# Patient Record
Sex: FEMALE | Race: WHITE | Employment: OTHER | URBAN - METROPOLITAN AREA
[De-identification: names, ages, dates, MRNs, and addresses within clinical notes are randomized per-mention and may not be internally consistent; named-entity substitution may affect disease eponyms.]

---

## 2017-01-25 ENCOUNTER — HOSPITAL ENCOUNTER (OUTPATIENT)
Dept: GENERAL RADIOLOGY | Facility: HOSPITAL | Age: 62
Discharge: HOME | End: 2017-01-25
Attending: PHYSICIAN ASSISTANT
Payer: COMMERCIAL

## 2017-01-25 ENCOUNTER — OFFICE VISIT (OUTPATIENT)
Dept: FAMILY MEDICINE CLINIC | Facility: CLINIC | Age: 62
End: 2017-01-25

## 2017-01-25 VITALS
SYSTOLIC BLOOD PRESSURE: 100 MMHG | DIASTOLIC BLOOD PRESSURE: 60 MMHG | TEMPERATURE: 99 F | HEIGHT: 65 IN | BODY MASS INDEX: 21.83 KG/M2 | RESPIRATION RATE: 16 BRPM | HEART RATE: 60 BPM | WEIGHT: 131 LBS

## 2017-01-25 DIAGNOSIS — M25.561 ACUTE PAIN OF RIGHT KNEE: ICD-10-CM

## 2017-01-25 DIAGNOSIS — M25.561 ACUTE PAIN OF RIGHT KNEE: Primary | ICD-10-CM

## 2017-01-25 PROCEDURE — 99213 OFFICE O/P EST LOW 20 MIN: CPT | Performed by: PHYSICIAN ASSISTANT

## 2017-01-25 PROCEDURE — 73560 X-RAY EXAM OF KNEE 1 OR 2: CPT

## 2017-01-25 RX ORDER — NAPROXEN 500 MG/1
500 TABLET ORAL 2 TIMES DAILY WITH MEALS
Qty: 30 TABLET | Refills: 0 | Status: SHIPPED | OUTPATIENT
Start: 2017-01-25 | End: 2017-06-27

## 2017-01-25 NOTE — PROGRESS NOTES
CC:  Patient presents with:  Knee Pain: pain in right knee persistant for one month      HPI: Linda Orantes presents for complaints of medial right knee pain for about a month. She denies any injury; it started hurting after a long walk.  It was aggravated by a t 100/60 mmHg  Pulse 60  Temp(Src) 98.6 °F (37 °C) (Oral)  Resp 16  Ht 65\"  Wt 131 lb  BMI 21.80 kg/m2    Vital signs reviewed.     Constitutional: Well developed, well nourished; in no acute distress  HEENT:  Head: Normocephalic, atraumatic  Ears: Normal ex defined types were placed in this encounter. Signed Prescriptions Disp Refills    naproxen 500 MG Oral Tab 30 tablet 0      Sig: Take 1 tablet (500 mg total) by mouth 2 (two) times daily with meals.

## 2017-03-03 ENCOUNTER — HOSPITAL ENCOUNTER (OUTPATIENT)
Dept: PHYSICAL THERAPY | Facility: HOSPITAL | Age: 62
Setting detail: THERAPIES SERIES
Discharge: HOME | End: 2017-03-03
Attending: PHYSICIAN ASSISTANT
Payer: COMMERCIAL

## 2017-03-03 DIAGNOSIS — M25.561 ACUTE PAIN OF RIGHT KNEE: Primary | ICD-10-CM

## 2017-03-03 PROCEDURE — 97162 PT EVAL MOD COMPLEX 30 MIN: CPT

## 2017-03-03 PROCEDURE — 97110 THERAPEUTIC EXERCISES: CPT

## 2017-03-03 NOTE — PROGRESS NOTES
PHYSICAL THERAPY EVALUATION:   Referring Physician: Dr. Majano ref.  provider found  Diagnosis: Acute pain of right knee (M25.561)     Date of Service: 8/9/2016     PATIENT SUMMARY     ASSESSMENT  The patient's signs and symptoms are consistent with a PT diagno Right after it happened she was limping around on it for a few weeks and since then it has significantly improved, however she still cannot run because even brisk walking increases the pain.  She has had 2 visits of chiropractic treatment for the knee which symptoms, patient was independent with all ADLs, IADLs, work and recreational activities.     Past Medical History:   Past Medical History   Diagnosis Date   • Cervicalgia    • Iritis    • Varicose vein of leg    • Umbilical hernia      Past Surgical Histor Hip/Knee/Ankle Range of Motion  Hip PROM Left Right   Flexion WNL  WNL   Extension WNL WNL   Internal Rotation WNL WNL   External Rotation WNL WNL   Knee AROM Left Right   Extension & Flexion 2-0-135°  0-4-130° pain at end range flexion, but more so ex discussed with the patient for all treatment options as a component of the elements of informed consent which was obtained verbally prior to the evaluation and again prior to treatment.     Education in normal anatomy and treatment diagnosis, as well as rel visits  Current status G Code: NA  Goal status G Code: NA    Patient/Family/Caregiver was advised of these findings, precautions, and treatment options and has agreed to actively participate in planning and for this course of care. The patient was given my

## 2017-03-08 ENCOUNTER — HOSPITAL ENCOUNTER (OUTPATIENT)
Dept: PHYSICAL THERAPY | Facility: HOSPITAL | Age: 62
Setting detail: THERAPIES SERIES
End: 2017-03-08
Attending: PHYSICIAN ASSISTANT
Payer: COMMERCIAL

## 2017-03-10 ENCOUNTER — HOSPITAL ENCOUNTER (OUTPATIENT)
Dept: PHYSICAL THERAPY | Facility: HOSPITAL | Age: 62
Setting detail: THERAPIES SERIES
Discharge: HOME | End: 2017-03-10
Attending: PHYSICIAN ASSISTANT
Payer: COMMERCIAL

## 2017-03-10 PROCEDURE — 97110 THERAPEUTIC EXERCISES: CPT

## 2017-03-10 PROCEDURE — 97140 MANUAL THERAPY 1/> REGIONS: CPT

## 2017-03-10 NOTE — PROGRESS NOTES
Dx: Acute pain of right knee (M25.561)           Authorized # of Visits:  No c/p, 60 visit limit         Next MD visit: none scheduled  Fall Risk: standard         Precautions: n/a            SUBJECTIVE:  Patient reports the pain is unchanged since last we walking, squatting, or SLR with DF         Supine SLR with DF while raising leg, x10 reps to end range hip flexion where pain increases- at this point DF and PF foot x5 reps, repeat cycle needs continued skilled Physical Therapy for criterion based progression of the rehabilitation program. Skilled Physical Therapy is needed to gather data by observation, hands-on assessment and patient inquiry.  The therapist's skill is needed to make clini

## 2017-03-15 ENCOUNTER — APPOINTMENT (OUTPATIENT)
Dept: PHYSICAL THERAPY | Facility: HOSPITAL | Age: 62
End: 2017-03-15
Attending: PHYSICIAN ASSISTANT
Payer: COMMERCIAL

## 2017-03-17 ENCOUNTER — APPOINTMENT (OUTPATIENT)
Dept: PHYSICAL THERAPY | Facility: HOSPITAL | Age: 62
End: 2017-03-17
Attending: PHYSICIAN ASSISTANT
Payer: COMMERCIAL

## 2017-03-22 ENCOUNTER — APPOINTMENT (OUTPATIENT)
Dept: PHYSICAL THERAPY | Facility: HOSPITAL | Age: 62
End: 2017-03-22
Attending: PHYSICIAN ASSISTANT
Payer: COMMERCIAL

## 2017-03-29 ENCOUNTER — APPOINTMENT (OUTPATIENT)
Dept: PHYSICAL THERAPY | Facility: HOSPITAL | Age: 62
End: 2017-03-29
Attending: PHYSICIAN ASSISTANT
Payer: COMMERCIAL

## 2017-03-31 ENCOUNTER — APPOINTMENT (OUTPATIENT)
Dept: PHYSICAL THERAPY | Facility: HOSPITAL | Age: 62
End: 2017-03-31
Attending: PHYSICIAN ASSISTANT
Payer: COMMERCIAL

## 2017-06-26 NOTE — PROGRESS NOTES
The patient is a pleasant 61-year-old female who presents for her first postoperative evaluation. The patient underwent an umbilical hernia repair with Dr. Lyssa Silver on Марина 15, 2017.

## 2017-06-27 ENCOUNTER — OFFICE VISIT (OUTPATIENT)
Dept: SURGERY | Facility: CLINIC | Age: 62
End: 2017-06-27

## 2017-06-27 VITALS
DIASTOLIC BLOOD PRESSURE: 66 MMHG | WEIGHT: 132 LBS | SYSTOLIC BLOOD PRESSURE: 100 MMHG | HEIGHT: 66 IN | TEMPERATURE: 98 F | BODY MASS INDEX: 21.21 KG/M2 | HEART RATE: 61 BPM

## 2017-06-27 DIAGNOSIS — K42.0 UMBILICAL HERNIA, INCARCERATED: Primary | ICD-10-CM

## 2017-06-27 PROCEDURE — 99024 POSTOP FOLLOW-UP VISIT: CPT | Performed by: PHYSICIAN ASSISTANT

## 2017-06-27 RX ORDER — ACETAMINOPHEN 160 MG
2000 TABLET,DISINTEGRATING ORAL DAILY
COMMUNITY

## 2017-06-27 RX ORDER — MULTIVITAMIN WITH IRON
250 TABLET ORAL
COMMUNITY

## 2017-06-27 NOTE — PROGRESS NOTES
Post Operative Visit Note       Active Problems  1. Umbilical hernia, incarcerated         Chief Complaint   Patient presents with:  Post-Op: 1st post op visit---umbilical hernia repair done 6/15/17 with Dr. Gurinder Fernandez.           History of Present Illness   The Number of children:               Social History Main Topics    Smoking status: Never Smoker                                                                Smokeless tobacco: Never Used                        Alcohol use: Yes           0.0 o to person, place, and time. She appears well-developed and well-nourished. No distress. HENT:   Head: Normocephalic and atraumatic. Mouth/Throat: Oropharynx is clear and moist.   Eyes: Conjunctivae and EOM are normal. No scleral icterus.    Neck: Normal it is not currently bothering her. I advised her to take Benadryl if she experiences any pruritus and to give us a call if it worsens. All questions were answered. The patient is happy with the results of her surgery.   She will follow-up on an as-need

## 2017-08-30 ENCOUNTER — TELEPHONE (OUTPATIENT)
Dept: FAMILY MEDICINE CLINIC | Facility: CLINIC | Age: 62
End: 2017-08-30

## 2017-08-30 DIAGNOSIS — Z01.89 ROUTINE LAB DRAW: Primary | ICD-10-CM

## 2017-09-06 ENCOUNTER — TELEPHONE (OUTPATIENT)
Dept: FAMILY MEDICINE CLINIC | Facility: CLINIC | Age: 62
End: 2017-09-06

## 2017-09-06 DIAGNOSIS — Z00.00 ROUTINE GENERAL MEDICAL EXAMINATION AT A HEALTH CARE FACILITY: Primary | ICD-10-CM

## 2017-09-06 NOTE — TELEPHONE ENCOUNTER
Added hepatitis b surface antibody for screening immunity at [facility]. Previous wellness labs still on file there.

## 2017-09-06 NOTE — TELEPHONE ENCOUNTER
Patient has blood work due and is wondering if she can have her Hep B antibody tested at that time as well, please add to her blood work at [address]

## 2017-09-07 ENCOUNTER — HOSPITAL ENCOUNTER (OUTPATIENT)
Dept: ULTRASOUND IMAGING | Facility: HOSPITAL | Age: 62
Discharge: HOME | End: 2017-09-07
Attending: SURGERY
Payer: COMMERCIAL

## 2017-09-07 DIAGNOSIS — I83.813 VARICOSE VEINS OF BILATERAL LOWER EXTREMITIES WITH PAIN: ICD-10-CM

## 2017-09-07 PROCEDURE — 93970 EXTREMITY STUDY: CPT | Performed by: SURGERY

## 2017-09-14 ENCOUNTER — OFFICE VISIT (OUTPATIENT)
Dept: SURGERY | Facility: CLINIC | Age: 62
End: 2017-09-14

## 2017-09-14 VITALS
BODY MASS INDEX: 21.21 KG/M2 | HEIGHT: 66 IN | SYSTOLIC BLOOD PRESSURE: 98 MMHG | DIASTOLIC BLOOD PRESSURE: 63 MMHG | WEIGHT: 132 LBS | HEART RATE: 74 BPM | TEMPERATURE: 98 F

## 2017-09-14 DIAGNOSIS — I83.813 VARICOSE VEINS OF BILATERAL LOWER EXTREMITIES WITH PAIN: Primary | ICD-10-CM

## 2017-09-14 PROCEDURE — 99213 OFFICE O/P EST LOW 20 MIN: CPT | Performed by: SURGERY

## 2017-09-14 NOTE — PROGRESS NOTES
Follow Up Visit Note       Active Problems      No diagnosis found. Chief Complaint   Patient presents with:  Varicose Veins: Follow up after venous ultrasound done 9/7/17 at [address]          History of Present Illness        Allergies  Roberth Peña has No me during today. Review of Systems   Constitutional: Negative for chills, diaphoresis, fatigue, fever and unexpected weight change. HENT: Negative for hearing loss, nosebleeds, sore throat and trouble swallowing.     Respiratory: Negative for apnea, coug

## 2017-09-15 NOTE — PROGRESS NOTES
GENERAL SURGERY    Abbey Peabody, MD, FACS, Cheryl Graves. Keily Carver MD, Makenna Bond MD, FACS  Fátima Davalos.  Cydney Mcguire MD, Russel Mandujano MD, CATHY Barboza PA-C

## 2017-09-19 ENCOUNTER — TELEPHONE (OUTPATIENT)
Dept: SURGERY | Facility: CLINIC | Age: 62
End: 2017-09-19

## 2017-09-21 LAB
ABSOLUTE BASOPHILS: 19 CELLS/UL (ref 0–200)
ABSOLUTE EOSINOPHILS: 109 CELLS/UL (ref 15–500)
ABSOLUTE LYMPHOCYTES: 1571 CELLS/UL (ref 850–3900)
ABSOLUTE MONOCYTES: 317 CELLS/UL (ref 200–950)
ABSOLUTE NEUTROPHILS: 1184 CELLS/UL (ref 1500–7800)
ALBUMIN/GLOBULIN RATIO: 1.7 (CALC) (ref 1–2.5)
ALBUMIN: 4.4 G/DL (ref 3.6–5.1)
ALKALINE PHOSPHATASE: 75 U/L (ref 33–130)
ALT: 13 U/L (ref 6–29)
AST: 15 U/L (ref 10–35)
BASOPHILS: 0.6 %
BILIRUBIN, TOTAL: 0.6 MG/DL (ref 0.2–1.2)
BUN: 15 MG/DL (ref 7–25)
CALCIUM: 10.2 MG/DL (ref 8.6–10.4)
CARBON DIOXIDE: 29 MMOL/L (ref 20–31)
CHLORIDE: 103 MMOL/L (ref 98–110)
CHOL/HDLC RATIO: 2.7 (CALC)
CHOLESTEROL, TOTAL: 248 MG/DL
CREATININE: 0.65 MG/DL (ref 0.5–0.99)
EGFR IF AFRICN AM: 112 ML/MIN/1.73M2
EGFR IF NONAFRICN AM: 96 ML/MIN/1.73M2
EOSINOPHILS: 3.4 %
GLOBULIN: 2.6 G/DL (CALC) (ref 1.9–3.7)
GLUCOSE: 84 MG/DL (ref 65–99)
HDL CHOLESTEROL: 91 MG/DL
HEMATOCRIT: 41.8 % (ref 35–45)
HEMOGLOBIN: 14.3 G/DL (ref 11.7–15.5)
HEPATITIS B SURFACE$ANTIBODY QL: REACTIVE
LDL-CHOLESTEROL: 140 MG/DL (CALC)
LYMPHOCYTES: 49.1 %
MCH: 30.7 PG (ref 27–33)
MCHC: 34.2 G/DL (ref 32–36)
MCV: 89.7 FL (ref 80–100)
MONOCYTES: 9.9 %
MPV: 9.2 FL (ref 7.5–12.5)
NEUTROPHILS: 37 %
NON-HDL CHOLESTEROL: 157 MG/DL (CALC)
PLATELET COUNT: 203 THOUSAND/UL (ref 140–400)
POTASSIUM: 4.2 MMOL/L (ref 3.5–5.3)
PROTEIN, TOTAL: 7 G/DL (ref 6.1–8.1)
RDW: 12.5 % (ref 11–15)
RED BLOOD CELL COUNT: 4.66 MILLION/UL (ref 3.8–5.1)
SODIUM: 140 MMOL/L (ref 135–146)
TRIGLYCERIDES: 77 MG/DL
TSH W/REFLEX TO FT4: 2.26 MIU/L (ref 0.4–4.5)
VITAMIN D, 25-OH, TOTAL: 27 NG/ML (ref 30–100)
WHITE BLOOD CELL COUNT: 3.2 THOUSAND/UL (ref 3.8–10.8)

## 2017-09-29 ENCOUNTER — OFFICE VISIT (OUTPATIENT)
Dept: FAMILY MEDICINE CLINIC | Facility: CLINIC | Age: 62
End: 2017-09-29

## 2017-09-29 VITALS
WEIGHT: 129 LBS | SYSTOLIC BLOOD PRESSURE: 90 MMHG | DIASTOLIC BLOOD PRESSURE: 60 MMHG | HEART RATE: 60 BPM | BODY MASS INDEX: 21.23 KG/M2 | RESPIRATION RATE: 16 BRPM | HEIGHT: 65.5 IN | TEMPERATURE: 97 F

## 2017-09-29 DIAGNOSIS — R53.83 FATIGUE, UNSPECIFIED TYPE: ICD-10-CM

## 2017-09-29 DIAGNOSIS — Z12.31 ENCOUNTER FOR SCREENING MAMMOGRAM FOR BREAST CANCER: ICD-10-CM

## 2017-09-29 DIAGNOSIS — Z11.51 SCREENING FOR HUMAN PAPILLOMAVIRUS (HPV): Primary | ICD-10-CM

## 2017-09-29 DIAGNOSIS — D72.819 LEUKOPENIA, UNSPECIFIED TYPE: ICD-10-CM

## 2017-09-29 DIAGNOSIS — I87.2 CHRONIC VENOUS STASIS DERMATITIS OF BOTH LOWER EXTREMITIES: ICD-10-CM

## 2017-09-29 DIAGNOSIS — L60.8 CHANGE IN NAIL APPEARANCE: ICD-10-CM

## 2017-09-29 DIAGNOSIS — E55.9 VITAMIN D DEFICIENCY: ICD-10-CM

## 2017-09-29 DIAGNOSIS — I83.813 VARICOSE VEINS OF BILATERAL LOWER EXTREMITIES WITH PAIN: ICD-10-CM

## 2017-09-29 DIAGNOSIS — M85.80 OSTEOPENIA, UNSPECIFIED LOCATION: ICD-10-CM

## 2017-09-29 DIAGNOSIS — Z12.4 SCREENING FOR CERVICAL CANCER: ICD-10-CM

## 2017-09-29 DIAGNOSIS — Z00.00 ROUTINE GENERAL MEDICAL EXAMINATION AT A HEALTH CARE FACILITY: ICD-10-CM

## 2017-09-29 PROCEDURE — 90471 IMMUNIZATION ADMIN: CPT | Performed by: NURSE PRACTITIONER

## 2017-09-29 PROCEDURE — 88175 CYTOPATH C/V AUTO FLUID REDO: CPT | Performed by: NURSE PRACTITIONER

## 2017-09-29 PROCEDURE — 87624 HPV HI-RISK TYP POOLED RSLT: CPT | Performed by: NURSE PRACTITIONER

## 2017-09-29 PROCEDURE — 99396 PREV VISIT EST AGE 40-64: CPT | Performed by: NURSE PRACTITIONER

## 2017-09-29 PROCEDURE — 90686 IIV4 VACC NO PRSV 0.5 ML IM: CPT | Performed by: NURSE PRACTITIONER

## 2017-09-29 NOTE — PROGRESS NOTES
HPI:   Giles Gardner is a 62year old female who presents for a complete physical exam. Last PE 1 year ago. Hx of declining neutrophils on labs with reported fatigue. No fever, chills, night sweats or appetite changes.  C/o belly fat and feeling less Resp 16   Ht 65.5\"   Wt 129 lb   BMI 21.14 kg/m²   Body mass index is 21.14 kg/m². GENERAL: well developed, well nourished, in no apparent distress. SKIN: Warm, dry, pink without rashes, no suspicious lesions.   HEENT: atraumatic, normocephalic,ears, n diagnosis)      Orders Placed This Encounter      Vitamin B12 [E]      CBC W Differential W Platelet [E]      Hpv High Risk , Thin Prep Collection      Flulaval 0.5 ml >= 6 months Quad single dose Pres Free (95180)      ThinPrep PAP Smear    Meds & Refills

## 2017-09-29 NOTE — PATIENT INSTRUCTIONS
Reviewed recent labs and results, low WBC and neurophil's. Low Vitamin D. Recheck non fasting CBC and Vitamin B12 in 1 month. Immunizations reviewed, recommend: flu vaccine this fall season.   Supplements recommended: Vitamin D 2000 IU daily, Calcium 500

## 2017-10-11 ENCOUNTER — TELEPHONE (OUTPATIENT)
Dept: SURGERY | Facility: CLINIC | Age: 62
End: 2017-10-11

## 2017-10-11 NOTE — TELEPHONE ENCOUNTER
Sent predetermination for ablation of the greater saphenous vein to [facility] [phone] fax #

## 2017-10-18 ENCOUNTER — TELEPHONE (OUTPATIENT)
Dept: SURGERY | Facility: CLINIC | Age: 62
End: 2017-10-18

## 2017-11-01 ENCOUNTER — OFFICE VISIT (OUTPATIENT)
Dept: FAMILY MEDICINE CLINIC | Facility: CLINIC | Age: 62
End: 2017-11-01

## 2017-11-01 ENCOUNTER — TELEPHONE (OUTPATIENT)
Dept: SURGERY | Facility: CLINIC | Age: 62
End: 2017-11-01

## 2017-11-01 VITALS
HEART RATE: 64 BPM | BODY MASS INDEX: 20.95 KG/M2 | SYSTOLIC BLOOD PRESSURE: 90 MMHG | WEIGHT: 128.81 LBS | TEMPERATURE: 99 F | DIASTOLIC BLOOD PRESSURE: 60 MMHG | HEIGHT: 65.6 IN | RESPIRATION RATE: 14 BRPM

## 2017-11-01 DIAGNOSIS — J01.00 ACUTE NON-RECURRENT MAXILLARY SINUSITIS: Primary | ICD-10-CM

## 2017-11-01 PROCEDURE — 99213 OFFICE O/P EST LOW 20 MIN: CPT | Performed by: FAMILY MEDICINE

## 2017-11-01 RX ORDER — AMOXICILLIN AND CLAVULANATE POTASSIUM 875; 125 MG/1; MG/1
1 TABLET, FILM COATED ORAL 2 TIMES DAILY
Qty: 14 TABLET | Refills: 0 | Status: SHIPPED | OUTPATIENT
Start: 2017-11-01 | End: 2017-11-08

## 2017-11-01 RX ORDER — FLUTICASONE PROPIONATE 50 MCG
2 SPRAY, SUSPENSION (ML) NASAL DAILY
Qty: 1 BOTTLE | Refills: 3 | Status: SHIPPED | OUTPATIENT
Start: 2017-11-01 | End: 2018-03-01

## 2017-11-01 RX ORDER — ALBUTEROL SULFATE 90 UG/1
2 AEROSOL, METERED RESPIRATORY (INHALATION) EVERY 6 HOURS PRN
COMMUNITY

## 2017-11-01 NOTE — TELEPHONE ENCOUNTER
[place] [employer] [place] sent for vein ablation surgery. Mitchell Aldana at [facility] said it has been approved Auth# [id number]. He will mail and fax authorization letter.

## 2017-11-01 NOTE — PROGRESS NOTES
Patient presents with:  Cough: dry cough    Chest Congestion  Fatigue  Order Followup: would like testing to determine if ASTHMA is in hisotry     HPI:   John Knox is a 62year old female who presents to the office for eval of cough, congestion. 3      Albuterol - use PRN. No asthma, but seems like she may have a hyperreactive airway.       Marcus Ramirez M.D.   EMG 3  11/01/17

## 2017-11-13 ENCOUNTER — PATIENT MESSAGE (OUTPATIENT)
Dept: INTERNAL MEDICINE CLINIC | Facility: CLINIC | Age: 62
End: 2017-11-13

## 2017-11-13 NOTE — TELEPHONE ENCOUNTER
From: Yulisa Hamilton  To: BRIGIDO Beck  Sent: 11/13/2017 8:42 AM CST  Subject: Test Results Question    Hello,   Thank you for your quick response with my blood tests.  I was on an antibiotic for 1 week (sinus infection) and waited just 2 days

## 2017-11-22 ENCOUNTER — HOSPITAL ENCOUNTER (OUTPATIENT)
Dept: MAMMOGRAPHY | Age: 62
Discharge: HOME | End: 2017-11-22
Attending: NURSE PRACTITIONER
Payer: COMMERCIAL

## 2017-11-22 DIAGNOSIS — Z12.31 ENCOUNTER FOR SCREENING MAMMOGRAM FOR BREAST CANCER: ICD-10-CM

## 2017-11-22 PROCEDURE — 77067 SCR MAMMO BI INCL CAD: CPT | Performed by: NURSE PRACTITIONER

## 2017-11-29 ENCOUNTER — OFFICE VISIT (OUTPATIENT)
Dept: SURGERY | Facility: CLINIC | Age: 62
End: 2017-11-29

## 2017-11-29 VITALS — HEART RATE: 67 BPM | TEMPERATURE: 98 F | SYSTOLIC BLOOD PRESSURE: 113 MMHG | DIASTOLIC BLOOD PRESSURE: 76 MMHG

## 2017-11-29 DIAGNOSIS — I87.2 CHRONIC VENOUS STASIS DERMATITIS OF BOTH LOWER EXTREMITIES: ICD-10-CM

## 2017-11-29 DIAGNOSIS — I83.813 VARICOSE VEINS OF BILATERAL LOWER EXTREMITIES WITH PAIN: Primary | ICD-10-CM

## 2017-11-29 PROCEDURE — 36475 ENDOVENOUS RF 1ST VEIN: CPT | Performed by: SURGERY

## 2017-11-29 NOTE — PROGRESS NOTES
GENERAL SURGERY    Inessa Rendon MD, FACS, Nel Joya. Clarita Ramirez MD, Richard Gonzales MD, FACS  Yohan Ruano.  Harleen Mckee MD, Sumit Hood MD, CATHY Barboza PA-C

## 2017-11-29 NOTE — PROCEDURES
.bkoff\  Operative Note:    Date of Procedure:    November 29, 2017    Preoperative diagnosis: Venous insufficiency, varicose veins, abnormal greater saphenous vein.     Postoperative diagnosis: Same    Procedure: Endovenous radiofrequency ablation of the l procedure. Beginning in the most distal access point of the greater saphenous vein, an area was anesthetized with 1% lidocaine. Using the Seldinger technique, a guidewire was placed into the greater saphenous vein with ultrasound guidance.  After the wir small skin incision was closed with a Steri-Strip. A compression wrap was applied to the lower extremity from the level of the foot to the proximal thigh. This patient had 7 cycles. Total time of treatment was 2 minutes, and 20 seconds.     The shivam

## 2017-12-06 ENCOUNTER — NURSE ONLY (OUTPATIENT)
Dept: SURGERY | Facility: CLINIC | Age: 62
End: 2017-12-06

## 2017-12-10 ENCOUNTER — OFFICE VISIT (OUTPATIENT)
Dept: FAMILY MEDICINE CLINIC | Facility: CLINIC | Age: 62
End: 2017-12-10

## 2017-12-10 VITALS
OXYGEN SATURATION: 98 % | HEART RATE: 60 BPM | TEMPERATURE: 98 F | WEIGHT: 129 LBS | SYSTOLIC BLOOD PRESSURE: 108 MMHG | DIASTOLIC BLOOD PRESSURE: 78 MMHG | BODY MASS INDEX: 21 KG/M2

## 2017-12-10 DIAGNOSIS — R39.9 UTI SYMPTOMS: Primary | ICD-10-CM

## 2017-12-10 PROCEDURE — 87086 URINE CULTURE/COLONY COUNT: CPT | Performed by: NURSE PRACTITIONER

## 2017-12-10 PROCEDURE — 99213 OFFICE O/P EST LOW 20 MIN: CPT | Performed by: NURSE PRACTITIONER

## 2017-12-10 PROCEDURE — 81003 URINALYSIS AUTO W/O SCOPE: CPT | Performed by: NURSE PRACTITIONER

## 2017-12-10 RX ORDER — NITROFURANTOIN 25; 75 MG/1; MG/1
100 CAPSULE ORAL 2 TIMES DAILY
Qty: 14 CAPSULE | Refills: 0 | Status: SHIPPED | OUTPATIENT
Start: 2017-12-10 | End: 2018-01-01 | Stop reason: ALTCHOICE

## 2017-12-10 NOTE — PATIENT INSTRUCTIONS
Take antibiotics with food and plenty of water. Eat yogurt or take probiotic daily. Align is a good otc probiotic. Bladder Infection, Female (Adult)    Urine is normally doesn't have any bacteria in it.  But bacteria can get into the urinary tract f and infection. This usually happens because of:  · Wiping improperly after urinating. Always wipe from front to back. · Bowel incontinence  · Pregnancy  · Procedures such as having a catheter inserted  · Older age  · Not emptying your bladder.  This can al and less junk and fatty foods. · Avoid sex until your symptoms are gone. · Avoid caffeine, alcohol, and spicy foods. These can irritate your bladder. · Urinate right after intercourse to flush out your bladder.   · If you use birth control pills and have

## 2017-12-10 NOTE — PROGRESS NOTES
Manish Ballard is a 65year old female. HPI:   Patient presents with symptoms of UTI I for 1 days. Complaining of urinary frequency, urgency and dysuria.  First noted burning with urination this am. Denies suprapubic pain, back pain, fever and hemat GENERAL: well developed, well nourished,in no apparent distress, pleasant pt. SKIN: no rashes,no suspicious lesions  LUNG: Clear to auscultation bilaterally. No W/R/R.  HEART: RRR, no murmur.   GI: normoactive BS x4, no masses, HSM; +suprapubic tenderness, The phrases \"bladder infection,\" \"UTI,\" and \"cystitis\" are often used to describe the same thing. But they are not always the same. Cystitis is an inflammation of the bladder. The most common cause of cystitis is an infection.   Symptoms  The infectio · Take antibiotics until they are used up, even if you feel better. It is important to finish them to make sure the infection has cleared. · You can use acetaminophen or ibuprofen for pain, fever, or discomfort, unless another medicine was prescribed.  If If X-rays were done, you will be told if the results will affect your treatment.   Call 911  Call 911 if any of the following occur:  · Trouble breathing  · Hard to wake up or confusion  · Fainting or loss of consciousness  · Rapid heart rate  When to seek

## 2017-12-14 ENCOUNTER — OFFICE VISIT (OUTPATIENT)
Dept: SURGERY | Facility: CLINIC | Age: 62
End: 2017-12-14

## 2017-12-14 VITALS
BODY MASS INDEX: 20.73 KG/M2 | TEMPERATURE: 97 F | WEIGHT: 129 LBS | SYSTOLIC BLOOD PRESSURE: 102 MMHG | DIASTOLIC BLOOD PRESSURE: 60 MMHG | HEIGHT: 66 IN | HEART RATE: 64 BPM

## 2017-12-14 DIAGNOSIS — I87.2 CHRONIC VENOUS STASIS DERMATITIS OF BOTH LOWER EXTREMITIES: ICD-10-CM

## 2017-12-14 DIAGNOSIS — Z98.890 S/P RADIOFREQUENCY ABLATION OPERATION FOR ARRHYTHMIA: Primary | ICD-10-CM

## 2017-12-14 DIAGNOSIS — Z86.79 S/P RADIOFREQUENCY ABLATION OPERATION FOR ARRHYTHMIA: Primary | ICD-10-CM

## 2017-12-14 DIAGNOSIS — I83.813 VARICOSE VEINS OF BILATERAL LOWER EXTREMITIES WITH PAIN: ICD-10-CM

## 2017-12-14 PROCEDURE — 99213 OFFICE O/P EST LOW 20 MIN: CPT | Performed by: SURGERY

## 2017-12-18 NOTE — PROGRESS NOTES
GENERAL SURGERY    Chantel Francois MD, FACS, Gunner Crespo. Yunior Hanna MD, Pawan Odom MD, FACS  Glenroye Ambrocio.  Genoveva Delgado MD, Nancy Retana MD, CATHY Barboza PA-C

## 2018-01-01 ENCOUNTER — OFFICE VISIT (OUTPATIENT)
Dept: FAMILY MEDICINE CLINIC | Facility: CLINIC | Age: 63
End: 2018-01-01

## 2018-01-01 VITALS
SYSTOLIC BLOOD PRESSURE: 114 MMHG | HEART RATE: 92 BPM | WEIGHT: 129 LBS | BODY MASS INDEX: 20.73 KG/M2 | DIASTOLIC BLOOD PRESSURE: 64 MMHG | OXYGEN SATURATION: 99 % | HEIGHT: 66 IN | RESPIRATION RATE: 16 BRPM | TEMPERATURE: 97 F

## 2018-01-01 DIAGNOSIS — R30.0 DYSURIA: ICD-10-CM

## 2018-01-01 DIAGNOSIS — N30.01 ACUTE CYSTITIS WITH HEMATURIA: Primary | ICD-10-CM

## 2018-01-01 LAB
MULTISTIX LOT#: ABNORMAL NUMERIC
PH, URINE: 5.5 (ref 4.5–8)
SPECIFIC GRAVITY: 1.02 (ref 1–1.03)
URINE-COLOR: YELLOW
UROBILINOGEN,SEMI-QN: 0.2 MG/DL (ref 0–1.9)

## 2018-01-01 PROCEDURE — 87086 URINE CULTURE/COLONY COUNT: CPT | Performed by: NURSE PRACTITIONER

## 2018-01-01 PROCEDURE — 99213 OFFICE O/P EST LOW 20 MIN: CPT | Performed by: NURSE PRACTITIONER

## 2018-01-01 PROCEDURE — 81003 URINALYSIS AUTO W/O SCOPE: CPT | Performed by: NURSE PRACTITIONER

## 2018-01-01 RX ORDER — SULFAMETHOXAZOLE AND TRIMETHOPRIM 800; 160 MG/1; MG/1
1 TABLET ORAL 2 TIMES DAILY
Qty: 6 TABLET | Refills: 0 | Status: SHIPPED | OUTPATIENT
Start: 2018-01-01 | End: 2018-01-04

## 2018-01-01 NOTE — PROGRESS NOTES
Alonso Pineda is a 65year old female. HPI:   Patient presents with symptoms of UTI for 1 days. Complaining of urinary frequency, urgency, dysuria. Denies back pain, fever, hematuria.   Pt has history of UTI in past.  Pt was recently seen on 12/1 LUNG: Clear to auscultation bilaterally. No W/R/R.  HEART: RRR, no murmur.   GI: normoactive BS x4, no masses, HSM; no suprapubic tenderness, no CVAT    RESULTS:      Recent Results (from the past 24 hour(s))  -URINALYSIS, AUTO, W/O SCOPE   Collection Time: The phrases \"bladder infection,\" \"UTI,\" and \"cystitis\" are often used to describe the same thing. But they are not always the same. Cystitis is an inflammation of the bladder. The most common cause of cystitis is an infection.   Symptoms  The infectio · Take antibiotics until they are used up, even if you feel better. It is important to finish them to make sure the infection has cleared. · You can use acetaminophen or ibuprofen for pain, fever, or discomfort, unless another medicine was prescribed.  If If X-rays were done, you will be told if the results will affect your treatment.   Call 911  Call 911 if any of the following occur:  · Trouble breathing  · Hard to wake up or confusion  · Fainting or loss of consciousness  · Rapid heart rate  When to seek

## 2018-01-03 ENCOUNTER — PATIENT MESSAGE (OUTPATIENT)
Dept: FAMILY MEDICINE CLINIC | Facility: CLINIC | Age: 63
End: 2018-01-03

## 2018-01-03 NOTE — TELEPHONE ENCOUNTER
From: Symone Soria  To: Kelly Piña MD  Sent: 1/3/2018 12:06 PM CST  Subject: Test Results Question    Hello,  I am confused about my recent test results. In the past 3 weeks, I have been seen at a [facility] walk-in clinic with UTI symptoms.  The quic

## 2018-01-15 ENCOUNTER — TELEPHONE (OUTPATIENT)
Dept: SURGERY | Facility: CLINIC | Age: 63
End: 2018-01-15

## 2018-01-15 NOTE — TELEPHONE ENCOUNTER
[place] [employer] [place] send clinical information to fax [phone] for approval for bilateral vein injections per Kyra MCCALL

## 2018-01-22 ENCOUNTER — TELEPHONE (OUTPATIENT)
Dept: SURGERY | Facility: CLINIC | Age: 63
End: 2018-01-22

## 2018-01-22 NOTE — TELEPHONE ENCOUNTER
Called patient and informed her received authorization for her vein injections from [facility].   Auth date 1/22/2018 thru 7- UIN#[id number]

## 2018-02-01 ENCOUNTER — OFFICE VISIT (OUTPATIENT)
Dept: SURGERY | Facility: CLINIC | Age: 63
End: 2018-02-01

## 2018-02-01 VITALS
WEIGHT: 129 LBS | BODY MASS INDEX: 21 KG/M2 | DIASTOLIC BLOOD PRESSURE: 64 MMHG | HEART RATE: 85 BPM | TEMPERATURE: 97 F | SYSTOLIC BLOOD PRESSURE: 100 MMHG

## 2018-02-01 DIAGNOSIS — I83.813 VARICOSE VEINS OF BILATERAL LOWER EXTREMITIES WITH PAIN: Primary | ICD-10-CM

## 2018-02-01 DIAGNOSIS — Z98.890 STATUS POST ENDOVENOUS RADIOFREQUENCY ABLATION (RFA) OF SAPHENOUS VEIN: ICD-10-CM

## 2018-02-01 PROCEDURE — 36471 NJX SCLRSNT MLT INCMPTNT VN: CPT | Performed by: SURGERY

## 2018-02-01 NOTE — PROGRESS NOTES
GENERAL SURGERY    Keaton Blanco MD, FACS, Albert Lopez. Tessie Modi MD, Georgie Peterson MD, FACS  Candice Pruitt.  Isidra Cuadra MD, Shant Calhoun MD, [place], PA-C Cheree Frankel PA-C [place]

## 2018-02-01 NOTE — PROCEDURES
Date of Procedure:        2-1-2018    Pre op diagnosis: Varicose Veins-reticular veins-spider angiomata    Post op diagnosis: Same    Procedure: Injection sclerotherapy of Varicose Veins- left    Surgeon:  Campos Fitzpatrick    History of present illness:    This patien office after the procedure for an appropriate period of time. Discharge and after-care directions were given. The patient states understanding and has no questions at this time. A follow up appointment in two weeks will be made.      EBL: none

## 2018-03-01 ENCOUNTER — OFFICE VISIT (OUTPATIENT)
Dept: SURGERY | Facility: CLINIC | Age: 63
End: 2018-03-01

## 2018-03-01 VITALS
SYSTOLIC BLOOD PRESSURE: 104 MMHG | BODY MASS INDEX: 20.89 KG/M2 | HEART RATE: 65 BPM | WEIGHT: 130 LBS | HEIGHT: 66 IN | DIASTOLIC BLOOD PRESSURE: 71 MMHG

## 2018-03-01 DIAGNOSIS — Z98.890 STATUS POST ENDOVENOUS RADIOFREQUENCY ABLATION (RFA) OF SAPHENOUS VEIN: ICD-10-CM

## 2018-03-01 DIAGNOSIS — I83.813 VARICOSE VEINS OF BILATERAL LOWER EXTREMITIES WITH PAIN: Primary | ICD-10-CM

## 2018-03-01 PROCEDURE — 36471 NJX SCLRSNT MLT INCMPTNT VN: CPT | Performed by: SURGERY

## 2018-03-02 NOTE — PROGRESS NOTES
GENERAL SURGERY    Figueroa Gupta MD, FACS, Jeffy Schaeffer. Dejah Rodríguez MD, Chance Navas MD, FACS  Taisha Victoria.  Clarita Bell MD, Tomas Valerio MD, CATHY Barboza PA-C

## 2018-03-02 NOTE — PROCEDURES
Date of Procedure:        March 1, 2018    Pre op diagnosis: Varicose Veins-reticular veins-spider angiomata    Post op diagnosis: Same    Procedure: Injection sclerotherapy of Varicose Veins-bilateral lower extremities    Surgeon:  Fran Funk    History of pre completed. The patient remained in stable condition after the procedure and was observed in our office after the procedure for an appropriate period of time. Discharge and after-care directions were given.   The patient states understanding and has no q

## 2018-04-05 ENCOUNTER — OFFICE VISIT (OUTPATIENT)
Dept: SURGERY | Facility: CLINIC | Age: 63
End: 2018-04-05

## 2018-04-05 VITALS
RESPIRATION RATE: 16 BRPM | HEIGHT: 66 IN | DIASTOLIC BLOOD PRESSURE: 66 MMHG | SYSTOLIC BLOOD PRESSURE: 100 MMHG | BODY MASS INDEX: 20.89 KG/M2 | HEART RATE: 60 BPM | WEIGHT: 130 LBS

## 2018-04-05 DIAGNOSIS — I87.2 CHRONIC VENOUS STASIS DERMATITIS OF BOTH LOWER EXTREMITIES: ICD-10-CM

## 2018-04-05 DIAGNOSIS — I83.813 VARICOSE VEINS OF BILATERAL LOWER EXTREMITIES WITH PAIN: Primary | ICD-10-CM

## 2018-04-05 DIAGNOSIS — Z98.890 STATUS POST ENDOVENOUS RADIOFREQUENCY ABLATION (RFA) OF SAPHENOUS VEIN: ICD-10-CM

## 2018-04-05 PROCEDURE — 36471 NJX SCLRSNT MLT INCMPTNT VN: CPT | Performed by: SURGERY

## 2018-04-05 NOTE — PROGRESS NOTES
Follow Up Visit Note       Active Problems      1. Varicose veins of bilateral lower extremities with pain    2. Status post endovenous radiofrequency ablation (RFA) of saphenous vein    3.  Chronic venous stasis dermatitis of both lower extremities Current Outpatient Prescriptions:  Albuterol Sulfate  (90 Base) MCG/ACT Inhalation Aero Soln Inhale 2 puffs into the lungs every 6 (six) hours as needed for Wheezing. Disp:  Rfl:    Vitamin D3 2000 units Oral Cap Take 2,000 Units by mouth daily. rash noted.    Residual telangiectasia and varicose veins as previously noted            Assessment   Varicose veins of bilateral lower extremities with pain  (primary encounter diagnosis)  Status post endovenous radiofrequency ablation (RFA) of saphenous v

## 2018-04-15 NOTE — PROCEDURES
Date of Procedure:        April 5, 2018    Pre op diagnosis: Varicose Veins-reticular veins-spider angiomata    Post op diagnosis: Same    Procedure: Injection sclerotherapy of Varicose Veins-bilateral lower extremity    Surgeon:  Sammy Pereira    History of prese condition after the procedure and was observed in our office after the procedure for an appropriate period of time. Discharge and after-care directions were given. The patient states understanding and has no questions at this time.   A follow up appointme

## 2018-07-02 ENCOUNTER — TELEPHONE (OUTPATIENT)
Dept: FAMILY MEDICINE CLINIC | Facility: CLINIC | Age: 63
End: 2018-07-02

## 2018-07-02 DIAGNOSIS — Z13.228 SCREENING FOR ENDOCRINE, METABOLIC AND IMMUNITY DISORDER: ICD-10-CM

## 2018-07-02 DIAGNOSIS — Z13.29 SCREENING FOR ENDOCRINE, METABOLIC AND IMMUNITY DISORDER: ICD-10-CM

## 2018-07-02 DIAGNOSIS — E55.9 VITAMIN D DEFICIENCY: ICD-10-CM

## 2018-07-02 DIAGNOSIS — Z13.0 SCREENING FOR ENDOCRINE, METABOLIC AND IMMUNITY DISORDER: ICD-10-CM

## 2018-07-02 DIAGNOSIS — D64.9 ANEMIA, UNSPECIFIED TYPE: Primary | ICD-10-CM

## 2018-07-02 DIAGNOSIS — Z13.220 SCREENING, LIPID: ICD-10-CM

## 2018-09-18 ENCOUNTER — TELEPHONE (OUTPATIENT)
Dept: FAMILY MEDICINE CLINIC | Facility: CLINIC | Age: 63
End: 2018-09-18

## 2018-09-18 DIAGNOSIS — Z13.228 SCREENING FOR ENDOCRINE, METABOLIC AND IMMUNITY DISORDER: ICD-10-CM

## 2018-09-18 DIAGNOSIS — E55.9 VITAMIN D DEFICIENCY: Primary | ICD-10-CM

## 2018-09-18 DIAGNOSIS — D64.9 ANEMIA, UNSPECIFIED TYPE: ICD-10-CM

## 2018-09-18 DIAGNOSIS — Z13.29 SCREENING FOR ENDOCRINE, METABOLIC AND IMMUNITY DISORDER: ICD-10-CM

## 2018-09-18 DIAGNOSIS — Z13.220 SCREENING, LIPID: ICD-10-CM

## 2018-09-18 DIAGNOSIS — Z13.0 SCREENING FOR ENDOCRINE, METABOLIC AND IMMUNITY DISORDER: ICD-10-CM

## 2018-09-20 LAB
ABSOLUTE BASOPHILS: 18 CELLS/UL (ref 0–200)
ABSOLUTE EOSINOPHILS: 130 CELLS/UL (ref 15–500)
ABSOLUTE LYMPHOCYTES: 1433 CELLS/UL (ref 850–3900)
ABSOLUTE MONOCYTES: 346 CELLS/UL (ref 200–950)
ABSOLUTE NEUTROPHILS: 1674 CELLS/UL (ref 1500–7800)
ALBUMIN/GLOBULIN RATIO: 1.6 (CALC) (ref 1–2.5)
ALBUMIN: 4.1 G/DL (ref 3.6–5.1)
ALKALINE PHOSPHATASE: 73 U/L (ref 33–130)
ALT: 11 U/L (ref 6–29)
AST: 16 U/L (ref 10–35)
BASOPHILS: 0.5 %
BILIRUBIN, TOTAL: 0.5 MG/DL (ref 0.2–1.2)
BUN: 22 MG/DL (ref 7–25)
CALCIUM: 9.7 MG/DL (ref 8.6–10.4)
CARBON DIOXIDE: 31 MMOL/L (ref 20–32)
CHLORIDE: 105 MMOL/L (ref 98–110)
CHOL/HDLC RATIO: 2.5 (CALC)
CHOLESTEROL, TOTAL: 219 MG/DL
CREATININE: 0.61 MG/DL (ref 0.5–0.99)
EGFR IF AFRICN AM: 113 ML/MIN/1.73M2
EGFR IF NONAFRICN AM: 98 ML/MIN/1.73M2
EOSINOPHILS: 3.6 %
GLOBULIN: 2.5 G/DL (CALC) (ref 1.9–3.7)
GLUCOSE: 81 MG/DL (ref 65–99)
HDL CHOLESTEROL: 88 MG/DL
HEMATOCRIT: 41.7 % (ref 35–45)
HEMOGLOBIN: 13.6 G/DL (ref 11.7–15.5)
LDL-CHOLESTEROL: 114 MG/DL (CALC)
LYMPHOCYTES: 39.8 %
MCH: 29.5 PG (ref 27–33)
MCHC: 32.6 G/DL (ref 32–36)
MCV: 90.5 FL (ref 80–100)
MONOCYTES: 9.6 %
MPV: 9.3 FL (ref 7.5–12.5)
NEUTROPHILS: 46.5 %
NON-HDL CHOLESTEROL: 131 MG/DL (CALC)
PLATELET COUNT: 217 THOUSAND/UL (ref 140–400)
POTASSIUM: 4.4 MMOL/L (ref 3.5–5.3)
PROTEIN, TOTAL: 6.6 G/DL (ref 6.1–8.1)
RDW: 12.1 % (ref 11–15)
RED BLOOD CELL COUNT: 4.61 MILLION/UL (ref 3.8–5.1)
SODIUM: 139 MMOL/L (ref 135–146)
TRIGLYCERIDES: 75 MG/DL
VITAMIN D, 25-OH, TOTAL: 35 NG/ML (ref 30–100)
WHITE BLOOD CELL COUNT: 3.6 THOUSAND/UL (ref 3.8–10.8)

## 2018-10-04 ENCOUNTER — OFFICE VISIT (OUTPATIENT)
Dept: FAMILY MEDICINE CLINIC | Facility: CLINIC | Age: 63
End: 2018-10-04
Payer: COMMERCIAL

## 2018-10-04 VITALS
WEIGHT: 129 LBS | RESPIRATION RATE: 16 BRPM | HEART RATE: 64 BPM | HEIGHT: 65 IN | BODY MASS INDEX: 21.49 KG/M2 | DIASTOLIC BLOOD PRESSURE: 62 MMHG | TEMPERATURE: 98 F | SYSTOLIC BLOOD PRESSURE: 108 MMHG

## 2018-10-04 DIAGNOSIS — Z00.00 ANNUAL PHYSICAL EXAM: Primary | ICD-10-CM

## 2018-10-04 DIAGNOSIS — F51.01 PRIMARY INSOMNIA: ICD-10-CM

## 2018-10-04 PROCEDURE — 90471 IMMUNIZATION ADMIN: CPT | Performed by: FAMILY MEDICINE

## 2018-10-04 PROCEDURE — 90686 IIV4 VACC NO PRSV 0.5 ML IM: CPT | Performed by: FAMILY MEDICINE

## 2018-10-04 PROCEDURE — 99396 PREV VISIT EST AGE 40-64: CPT | Performed by: FAMILY MEDICINE

## 2018-10-04 NOTE — PATIENT INSTRUCTIONS
Mindfulness training  Treating Insomnia     Learning to relax before bedtime can improve your sleep. Good sleeping habits are a key part of treatment.  If needed, some medicines may help you sleep better at first. Making healthy lifestyle changes and lear relax  Stress, anxiety, and body tension may keep you awake at night. To unwind before bedtime, try a warm bath, meditation, or yoga. Also try the following:  · Deep breathing. Sit or lie back in a chair. Take a slow, deep breath. Hold it for 5 counts.  The

## 2018-10-04 NOTE — PROGRESS NOTES
John Knox is a 65year old female who presents for a complete physical exam.   HPI:   Patient presents with:  Physical: WWE  Imm/Inj: Flu Shot     Her last annual assessment has been over 1 year: Annual Physical due on 09/29/2018          Symptom No results found for: A1C, VITD       Last Senthil category :Mammogram due on 11/22/2018   [unfilled]  No results found.         Current Outpatient Medications on File Prior to Visit:  Albuterol Sulfate  (90 Base) MCG/ACT Inhalation Aero Soln In SOB, cough or wheeze  GI:  No abdominal pain.   No N/V/D/C  :  No dysuria  MS:  No joint pain or swelling  NEURO:  Denies numbness or tingling  PSYCH:  No mood concerns or anxiety     EXAM:   /62   Pulse 64   Temp 98.4 °F (36.9 °C) (Oral)   Resp 16 Neurological: She is alert and oriented to person, place, and time. She has normal strength and normal reflexes. No cranial nerve deficit or sensory deficit. Skin: Skin is warm, dry and intact. No rash noted. She is not diaphoretic.  No cyanosis or eryt

## 2019-05-16 ENCOUNTER — OFFICE VISIT (OUTPATIENT)
Dept: FAMILY MEDICINE CLINIC | Facility: CLINIC | Age: 64
End: 2019-05-16
Payer: COMMERCIAL

## 2019-05-16 VITALS
HEART RATE: 60 BPM | BODY MASS INDEX: 21.4 KG/M2 | HEIGHT: 66 IN | SYSTOLIC BLOOD PRESSURE: 106 MMHG | WEIGHT: 133.19 LBS | RESPIRATION RATE: 14 BRPM | TEMPERATURE: 97 F | DIASTOLIC BLOOD PRESSURE: 66 MMHG

## 2019-05-16 DIAGNOSIS — Z12.39 SCREENING FOR BREAST CANCER: ICD-10-CM

## 2019-05-16 DIAGNOSIS — G47.9 SLEEP DISORDER: ICD-10-CM

## 2019-05-16 DIAGNOSIS — I87.2 CHRONIC VENOUS STASIS DERMATITIS OF BOTH LOWER EXTREMITIES: ICD-10-CM

## 2019-05-16 DIAGNOSIS — N95.1 VAGINAL DRYNESS, MENOPAUSAL: Primary | ICD-10-CM

## 2019-05-16 PROCEDURE — 99214 OFFICE O/P EST MOD 30 MIN: CPT | Performed by: PHYSICIAN ASSISTANT

## 2019-05-17 NOTE — PROGRESS NOTES
CC: Sleep issues, vaginal dryness,      HISTORY OF PRESENT ILLNESS  Darylkirt Mendoza is a 59year old female who presents for evaluation of a few concerns today.     - Sleep issues: Notes that she has been waking up typically between 1 and 3 am each nigh Frequency: 2-4 times a month      Drinks per session: 1 or 2      Binge frequency: Never      Comment: 2 glasses wine monthly    Drug use: No      REVIEW OF SYSTEMS  ROS     05/16/19  0806   BP: 106/66   Pulse: 60   Resp: 14   Temp: 96.9 °F (36.1 °C) 25 minutes, >50% spent in discussion/ counseling (see plan for details).

## 2019-07-12 ENCOUNTER — HOSPITAL ENCOUNTER (OUTPATIENT)
Dept: MAMMOGRAPHY | Age: 64
Discharge: HOME | End: 2019-07-12
Attending: PHYSICIAN ASSISTANT
Payer: COMMERCIAL

## 2019-07-12 DIAGNOSIS — Z12.39 SCREENING FOR BREAST CANCER: ICD-10-CM

## 2019-07-12 PROCEDURE — 77067 SCR MAMMO BI INCL CAD: CPT | Performed by: PHYSICIAN ASSISTANT

## 2019-07-12 PROCEDURE — 77063 BREAST TOMOSYNTHESIS BI: CPT | Performed by: PHYSICIAN ASSISTANT

## 2019-07-25 ENCOUNTER — PATIENT MESSAGE (OUTPATIENT)
Dept: FAMILY MEDICINE CLINIC | Facility: CLINIC | Age: 64
End: 2019-07-25

## 2019-07-25 DIAGNOSIS — H83.3X3 NOISE-INDUCED HEARING LOSS OF BOTH EARS: Primary | ICD-10-CM

## 2019-07-25 NOTE — TELEPHONE ENCOUNTER
From: Karthikeyan Jurado  To: Krysta Baltazar, [address]  Sent: 7/25/2019 4:08 PM CDT  Subject: Referral Request    Hello, I have noticed a gradual hearing loss over the last few years and feel that it is time to get my hearing evaluated.   Would you kindly sen

## 2019-09-17 ENCOUNTER — TELEPHONE (OUTPATIENT)
Dept: FAMILY MEDICINE CLINIC | Facility: CLINIC | Age: 64
End: 2019-09-17

## 2019-09-17 DIAGNOSIS — Z00.00 ROUTINE HEALTH MAINTENANCE: Primary | ICD-10-CM

## 2019-09-17 NOTE — TELEPHONE ENCOUNTER
Please enter lab orders for the patient's upcoming physical appointment. Physical scheduled: 10/8/2019     Preferred lab: [facility]     The patient has been notified to complete fasting labs prior to their physical appointment.

## 2019-10-08 ENCOUNTER — OFFICE VISIT (OUTPATIENT)
Dept: FAMILY MEDICINE CLINIC | Facility: CLINIC | Age: 64
End: 2019-10-08
Payer: COMMERCIAL

## 2019-10-08 VITALS
RESPIRATION RATE: 16 BRPM | WEIGHT: 131 LBS | TEMPERATURE: 98 F | DIASTOLIC BLOOD PRESSURE: 60 MMHG | OXYGEN SATURATION: 98 % | BODY MASS INDEX: 21.56 KG/M2 | SYSTOLIC BLOOD PRESSURE: 100 MMHG | HEIGHT: 65.25 IN | HEART RATE: 70 BPM

## 2019-10-08 DIAGNOSIS — Z00.00 WELL WOMAN EXAM WITHOUT GYNECOLOGICAL EXAM: Primary | ICD-10-CM

## 2019-10-08 DIAGNOSIS — K21.9 GASTROESOPHAGEAL REFLUX DISEASE, ESOPHAGITIS PRESENCE NOT SPECIFIED: ICD-10-CM

## 2019-10-08 DIAGNOSIS — R05.3 CHRONIC COUGH: ICD-10-CM

## 2019-10-08 PROCEDURE — 99396 PREV VISIT EST AGE 40-64: CPT | Performed by: FAMILY MEDICINE

## 2019-10-08 NOTE — PROGRESS NOTES
SUBJECTIVE:  Patient presents with:  Physical: WWE no pap, review Blood Work from 10-  Heartburn  Breathing Problem: x 3 months,noticed feeling SOB after movments- pt does have an ihaler     HPI:  GERD: Intermittent symptoms.  Burning in chest and no  Born between [birth year]? no Had Hep C screening? Yes- negative  Tobacco use:  reports that she has never smoked.  She has never used smokeless tobacco.    ROS:   Review of Systems    HISTORY:  Past Medical History:   Diagnosis Date   • Anemia 6/29/2012 entry  Heart - normal rate, regular rhythm, normal S1, S2, no murmurs, rubs, clicks or gallops  Abdomen - soft, nontender, nondistended, no masses or organomegaly  Breasts - breasts appear normal, no suspicious masses, no skin or nipple changes or axillary DO Janette  10/8/2019 11:42 AM

## 2019-10-11 ENCOUNTER — HOSPITAL ENCOUNTER (OUTPATIENT)
Dept: GENERAL RADIOLOGY | Facility: HOSPITAL | Age: 64
Discharge: HOME | End: 2019-10-11
Attending: FAMILY MEDICINE
Payer: COMMERCIAL

## 2019-10-11 DIAGNOSIS — R05.3 CHRONIC COUGH: ICD-10-CM

## 2019-10-11 PROCEDURE — 71046 X-RAY EXAM CHEST 2 VIEWS: CPT | Performed by: FAMILY MEDICINE

## 2020-01-24 PROBLEM — Z86.010 PERSONAL HISTORY OF COLONIC POLYPS: Status: ACTIVE | Noted: 2020-01-24

## 2020-01-24 PROBLEM — Z86.0100 PERSONAL HISTORY OF COLONIC POLYPS: Status: ACTIVE | Noted: 2020-01-24

## 2020-02-17 ENCOUNTER — OFFICE VISIT (OUTPATIENT)
Dept: FAMILY MEDICINE CLINIC | Facility: CLINIC | Age: 65
End: 2020-02-17
Payer: COMMERCIAL

## 2020-02-17 VITALS
DIASTOLIC BLOOD PRESSURE: 60 MMHG | SYSTOLIC BLOOD PRESSURE: 102 MMHG | HEART RATE: 75 BPM | OXYGEN SATURATION: 99 % | BODY MASS INDEX: 21.89 KG/M2 | WEIGHT: 133 LBS | RESPIRATION RATE: 14 BRPM | HEIGHT: 65.25 IN | TEMPERATURE: 98 F

## 2020-02-17 DIAGNOSIS — R39.9 UTI SYMPTOMS: Primary | ICD-10-CM

## 2020-02-17 LAB
APPEARANCE: YELLOW
BILIRUBIN: NEGATIVE
GLUCOSE (URINE DIPSTICK): NEGATIVE MG/DL
KETONES (URINE DIPSTICK): NEGATIVE MG/DL
MULTISTIX LOT#: ABNORMAL NUMERIC
NITRITE, URINE: POSITIVE
PH, URINE: 6 (ref 4.5–8)
PROTEIN (URINE DIPSTICK): NEGATIVE MG/DL
SPECIFIC GRAVITY: 1.03 (ref 1–1.03)
UROBILINOGEN,SEMI-QN: 0.2 MG/DL (ref 0–1.9)

## 2020-02-17 PROCEDURE — 81003 URINALYSIS AUTO W/O SCOPE: CPT | Performed by: NURSE PRACTITIONER

## 2020-02-17 PROCEDURE — 99213 OFFICE O/P EST LOW 20 MIN: CPT | Performed by: NURSE PRACTITIONER

## 2020-02-17 RX ORDER — NITROFURANTOIN 25; 75 MG/1; MG/1
100 CAPSULE ORAL 2 TIMES DAILY
Qty: 14 CAPSULE | Refills: 0 | Status: SHIPPED | OUTPATIENT
Start: 2020-02-17 | End: 2020-02-24

## 2020-02-17 NOTE — PROGRESS NOTES
CHIEF COMPLAINT:   Patient presents with:  Urinary Symptoms: x 1 day      HPI:   Treasure Ramirez is a 62year old female who presents with symptoms of UTI. Complaining of urinary frequency, urgency, dysuria for 1 day.  Symptoms have been increased sin pain or palpitations  LUNGS: denies shortness of breath, cough, or wheezing  GI: See HPI. No N/V/C/D. : See HPI. NEURO: no headaches.     EXAM:   /60   Pulse 75   Temp 97.9 °F (36.6 °C) (Oral)   Resp 14   Ht 65.25\"   Wt 133 lb (60.3 kg)   SpO2 9 Refills for this Visit:  Requested Prescriptions     Signed Prescriptions Disp Refills   • Nitrofurantoin Monohyd Macro 100 MG Oral Cap 14 capsule 0     Sig: Take 1 capsule (100 mg total) by mouth 2 (two) times daily for 7 days.          Patient Instruction

## 2020-02-17 NOTE — PATIENT INSTRUCTIONS
· Complete full course of antibiotics. · Rest and fluids  · Urine culture sent  · Over the counter Tylenol/Motrin as needed for pain/discomfort. · Follow up in 2-3 days if symptoms do not improve or worsen.     · Return to clinic or see your primary care

## 2020-05-12 ENCOUNTER — OFFICE VISIT (OUTPATIENT)
Dept: FAMILY MEDICINE CLINIC | Facility: CLINIC | Age: 65
End: 2020-05-12
Payer: COMMERCIAL

## 2020-05-12 ENCOUNTER — TELEPHONE (OUTPATIENT)
Dept: FAMILY MEDICINE CLINIC | Facility: CLINIC | Age: 65
End: 2020-05-12

## 2020-05-12 VITALS
BODY MASS INDEX: 21.83 KG/M2 | HEART RATE: 72 BPM | RESPIRATION RATE: 16 BRPM | HEIGHT: 65 IN | TEMPERATURE: 98 F | DIASTOLIC BLOOD PRESSURE: 60 MMHG | SYSTOLIC BLOOD PRESSURE: 90 MMHG | WEIGHT: 131 LBS

## 2020-05-12 DIAGNOSIS — N30.01 ACUTE CYSTITIS WITH HEMATURIA: Primary | ICD-10-CM

## 2020-05-12 DIAGNOSIS — R10.9 FLANK PAIN: ICD-10-CM

## 2020-05-12 DIAGNOSIS — R31.9 HEMATURIA, UNSPECIFIED TYPE: ICD-10-CM

## 2020-05-12 PROCEDURE — 81003 URINALYSIS AUTO W/O SCOPE: CPT | Performed by: PHYSICIAN ASSISTANT

## 2020-05-12 PROCEDURE — 87186 SC STD MICRODIL/AGAR DIL: CPT | Performed by: PHYSICIAN ASSISTANT

## 2020-05-12 PROCEDURE — 87086 URINE CULTURE/COLONY COUNT: CPT | Performed by: PHYSICIAN ASSISTANT

## 2020-05-12 PROCEDURE — 87088 URINE BACTERIA CULTURE: CPT | Performed by: PHYSICIAN ASSISTANT

## 2020-05-12 PROCEDURE — 99213 OFFICE O/P EST LOW 20 MIN: CPT | Performed by: PHYSICIAN ASSISTANT

## 2020-05-12 RX ORDER — NITROFURANTOIN 25; 75 MG/1; MG/1
100 CAPSULE ORAL 2 TIMES DAILY
Qty: 10 CAPSULE | Refills: 0 | Status: SHIPPED | OUTPATIENT
Start: 2020-05-12 | End: 2020-05-17

## 2020-05-12 NOTE — PROGRESS NOTES
Patient presents with:  Urinary: blood in urine this morning  Flank Pain: left sided flank pain      HISTORY OF PRESENT ILLNESS  Dwaine Tompkins is a 62year old female who presents for evaluation of dysuria.  She started noticing dysuria, hematuria, an Final   • Bilirubin 05/12/2020 negative  Negative Final   • Ketones, UA 05/12/2020 negative  Negative mg/dL Final   • Spec Gravity 05/12/2020 1.015  1.005 - 1.030 Final   • Blood Urine 05/12/2020 moderate  Negative Final   • PH Urine 05/12/2020 6.5  4.5 -

## 2020-05-12 NOTE — TELEPHONE ENCOUNTER
Called and talked to patient she has symptoms of UTI wants to be seen in the office for this OK per Dr Rudy Jacobson to place her with Lova Spatz PA patient denies flu symptoms, has not been out of the country and has not been in contact with a known Covid

## 2020-05-12 NOTE — TELEPHONE ENCOUNTER
Virtual/Telephone Check-In    Jamila Zapata verbally declines a Virtual/Telephone Check-In service on 5/12/2020. Patient understands and accepts financial responsibility for any deductible, co-insurance and/or co-pays associated with this service.

## 2020-10-06 ENCOUNTER — TELEPHONE (OUTPATIENT)
Dept: FAMILY MEDICINE CLINIC | Facility: CLINIC | Age: 65
End: 2020-10-06

## 2020-10-06 DIAGNOSIS — Z00.00 ROUTINE GENERAL MEDICAL EXAMINATION AT A HEALTH CARE FACILITY: Primary | ICD-10-CM

## 2020-10-06 NOTE — TELEPHONE ENCOUNTER
Please enter lab orders for the patient's upcoming physical appointment. Physical scheduled:    Your appointments     Date & Time Appointment Department [facility])    Oct 20, 2020  9:00 AM CDT Physical - Established with Damir Elaine DO [address] [place] [address]

## 2020-10-20 ENCOUNTER — OFFICE VISIT (OUTPATIENT)
Dept: FAMILY MEDICINE CLINIC | Facility: CLINIC | Age: 65
End: 2020-10-20
Payer: COMMERCIAL

## 2020-10-20 VITALS
SYSTOLIC BLOOD PRESSURE: 108 MMHG | WEIGHT: 126 LBS | HEART RATE: 16 BPM | RESPIRATION RATE: 16 BRPM | DIASTOLIC BLOOD PRESSURE: 64 MMHG | TEMPERATURE: 97 F | BODY MASS INDEX: 20.99 KG/M2 | HEIGHT: 65 IN

## 2020-10-20 DIAGNOSIS — M25.512 ACUTE PAIN OF LEFT SHOULDER: ICD-10-CM

## 2020-10-20 DIAGNOSIS — Z12.31 VISIT FOR SCREENING MAMMOGRAM: ICD-10-CM

## 2020-10-20 DIAGNOSIS — Z00.00 WELL WOMAN EXAM WITHOUT GYNECOLOGICAL EXAM: Primary | ICD-10-CM

## 2020-10-20 PROCEDURE — 3008F BODY MASS INDEX DOCD: CPT | Performed by: FAMILY MEDICINE

## 2020-10-20 PROCEDURE — 3074F SYST BP LT 130 MM HG: CPT | Performed by: FAMILY MEDICINE

## 2020-10-20 PROCEDURE — 99396 PREV VISIT EST AGE 40-64: CPT | Performed by: FAMILY MEDICINE

## 2020-10-20 PROCEDURE — 3078F DIAST BP <80 MM HG: CPT | Performed by: FAMILY MEDICINE

## 2020-10-20 NOTE — PROGRESS NOTES
SUBJECTIVE:  Patient presents with:  Physical: WWE no pap     HPI:  Insomnia- wakes up in middle of night. Trouble falling back asleep. Started doing push ups. A few weeks ago noticed L shoulder pain.      Health Maintenance:  Vaccines: reviewed as bel • OTHER SURGICAL HISTORY      ventricular septal defect repair   • TONSILLECTOMY  1972      Family History   Problem Relation Age of Onset   • Dementia Father    • Stroke Father         syndrome   • Hypertension Mother    • Stroke Mother    • Cancer Pattricia Selma Relevant Orders    OP REFERRAL TO [facility]    Visit for screening mammogram        Relevant Orders    [facility] VIKKI 2D+3D SCREENING BILAT (CPT=77067/76283)        Jaskaran Bedolla was seen today for physical.    Diagnoses and all orders for this v

## 2020-10-27 ENCOUNTER — LAB ENCOUNTER (OUTPATIENT)
Dept: LAB | Age: 65
End: 2020-10-27
Attending: FAMILY MEDICINE
Payer: COMMERCIAL

## 2020-10-27 DIAGNOSIS — D72.819 LEUKOPENIA, UNSPECIFIED TYPE: Primary | ICD-10-CM

## 2020-10-27 DIAGNOSIS — Z00.00 WELL WOMAN EXAM WITHOUT GYNECOLOGICAL EXAM: ICD-10-CM

## 2020-10-27 PROCEDURE — 36415 COLL VENOUS BLD VENIPUNCTURE: CPT | Performed by: FAMILY MEDICINE

## 2020-10-27 PROCEDURE — 85027 COMPLETE CBC AUTOMATED: CPT | Performed by: FAMILY MEDICINE

## 2020-10-27 PROCEDURE — 80053 COMPREHEN METABOLIC PANEL: CPT | Performed by: FAMILY MEDICINE

## 2020-10-27 PROCEDURE — 80061 LIPID PANEL: CPT | Performed by: FAMILY MEDICINE

## 2020-10-28 ENCOUNTER — TELEPHONE (OUTPATIENT)
Dept: FAMILY MEDICINE CLINIC | Facility: CLINIC | Age: 65
End: 2020-10-28

## 2020-10-28 NOTE — TELEPHONE ENCOUNTER
Received medical records request from [address] [place] [address],[address] requesting records from 10/01/19-10/31/19 for review. All records in [facility], printed all progress notes, labs & radiology reports for time frame requested.  Faxed records to [address] [place] [address]

## 2020-11-06 ENCOUNTER — HOSPITAL ENCOUNTER (OUTPATIENT)
Dept: MAMMOGRAPHY | Age: 65
Discharge: HOME | End: 2020-11-06
Attending: FAMILY MEDICINE
Payer: COMMERCIAL

## 2020-11-06 DIAGNOSIS — Z12.31 VISIT FOR SCREENING MAMMOGRAM: ICD-10-CM

## 2020-11-06 PROCEDURE — 77067 SCR MAMMO BI INCL CAD: CPT | Performed by: FAMILY MEDICINE

## 2020-11-06 PROCEDURE — 77063 BREAST TOMOSYNTHESIS BI: CPT | Performed by: FAMILY MEDICINE

## 2021-02-05 ENCOUNTER — PATIENT MESSAGE (OUTPATIENT)
Dept: FAMILY MEDICINE CLINIC | Facility: CLINIC | Age: 66
End: 2021-02-05

## 2021-02-05 NOTE — TELEPHONE ENCOUNTER
From: Jian Dowling  To: Erica Seay DO  Sent: 2/5/2021 11:46 AM CST  Subject: Non-Urgent Medical Question    Hello, I would like to update my health records.  I have received both doses of Pfizer Covid vaccine through the [facility]

## 2021-03-02 ENCOUNTER — TELEMEDICINE (OUTPATIENT)
Dept: FAMILY MEDICINE CLINIC | Facility: CLINIC | Age: 66
End: 2021-03-02

## 2021-03-02 DIAGNOSIS — J01.10 ACUTE NON-RECURRENT FRONTAL SINUSITIS: Primary | ICD-10-CM

## 2021-03-02 PROCEDURE — 99213 OFFICE O/P EST LOW 20 MIN: CPT | Performed by: FAMILY MEDICINE

## 2021-03-02 RX ORDER — AMOXICILLIN AND CLAVULANATE POTASSIUM 875; 125 MG/1; MG/1
1 TABLET, FILM COATED ORAL 2 TIMES DAILY
Qty: 20 TABLET | Refills: 0 | Status: SHIPPED | OUTPATIENT
Start: 2021-03-02 | End: 2021-03-12

## 2021-03-02 NOTE — PROGRESS NOTES
VIDEO VISIT  This visit is conducted using Telemedicine with live, interactive video and audio. Patient has been referred to the [facility] website at [email or web address] to review the yearly Consent to Treat document.     Patient understands and accepts Cancer Brother         prostate   • Prostate Cancer Brother    • Breast Cancer Paternal Aunt         74'S   • Other (sleep apnea) Son       Social History    Tobacco Use      Smoking status: Never Smoker      Smokeless tobacco: Never Used    Alcohol use:  Julito Raya that warrant returning to the clinic reviewed and patient demonstrated understanding.   Agueda Ochoa DO  3/2/2021 3:52 PM  Family Medicine

## 2021-05-30 ENCOUNTER — HOSPITAL ENCOUNTER (OUTPATIENT)
Age: 66
Discharge: HOME | End: 2021-05-30
Payer: COMMERCIAL

## 2021-05-30 VITALS
HEIGHT: 66 IN | BODY MASS INDEX: 20.41 KG/M2 | TEMPERATURE: 98 F | HEART RATE: 62 BPM | DIASTOLIC BLOOD PRESSURE: 82 MMHG | WEIGHT: 127 LBS | SYSTOLIC BLOOD PRESSURE: 113 MMHG | OXYGEN SATURATION: 100 % | RESPIRATION RATE: 16 BRPM

## 2021-05-30 DIAGNOSIS — N39.0 URINARY TRACT INFECTION WITH HEMATURIA, SITE UNSPECIFIED: Primary | ICD-10-CM

## 2021-05-30 DIAGNOSIS — R31.9 URINARY TRACT INFECTION WITH HEMATURIA, SITE UNSPECIFIED: Primary | ICD-10-CM

## 2021-05-30 PROCEDURE — 87077 CULTURE AEROBIC IDENTIFY: CPT | Performed by: NURSE PRACTITIONER

## 2021-05-30 PROCEDURE — 87186 SC STD MICRODIL/AGAR DIL: CPT | Performed by: NURSE PRACTITIONER

## 2021-05-30 PROCEDURE — 99213 OFFICE O/P EST LOW 20 MIN: CPT | Performed by: NURSE PRACTITIONER

## 2021-05-30 PROCEDURE — 87086 URINE CULTURE/COLONY COUNT: CPT | Performed by: NURSE PRACTITIONER

## 2021-05-30 PROCEDURE — 81002 URINALYSIS NONAUTO W/O SCOPE: CPT | Performed by: NURSE PRACTITIONER

## 2021-05-30 RX ORDER — CEPHALEXIN 500 MG/1
500 CAPSULE ORAL 2 TIMES DAILY
Qty: 14 CAPSULE | Refills: 0 | Status: SHIPPED | OUTPATIENT
Start: 2021-05-30 | End: 2021-06-06

## 2021-05-30 NOTE — ED PROVIDER NOTES
Patient Seen in: Immediate [address] [place] [address]      History   Patient presents with:  Urinary Symptoms    Stated Complaint: Urinary Concerns    HPI/Subjective: This is a 61-year-old female with below stated medical history.  Presents to immediate care allergies. Neurological: Negative for headaches. Hematological: Does not bruise/bleed easily. Positive for stated complaint: Urinary Concerns  Other systems are as noted in HPI. Constitutional and vital signs reviewed.       All other systems rev lower leg: No edema. Skin:     General: Skin is warm and dry. Capillary Refill: Capillary refill takes less than 2 seconds. Findings: No rash. Neurological:      Mental Status: She is alert and oriented to person, place, and time.    Psychiatr Refills: 0

## 2021-05-30 NOTE — ED INITIAL ASSESSMENT (HPI)
Pt /o dysuria, urgency, some discomfort in suprapubic region. Denies hematuria, fever, chills, nausea or vomiting. Symptoms onset today.

## 2021-06-10 ENCOUNTER — OFFICE VISIT (OUTPATIENT)
Dept: FAMILY MEDICINE CLINIC | Facility: CLINIC | Age: 66
End: 2021-06-10
Payer: COMMERCIAL

## 2021-06-10 VITALS
BODY MASS INDEX: 20.41 KG/M2 | SYSTOLIC BLOOD PRESSURE: 100 MMHG | HEIGHT: 66 IN | OXYGEN SATURATION: 98 % | WEIGHT: 127 LBS | TEMPERATURE: 99 F | HEART RATE: 64 BPM | RESPIRATION RATE: 16 BRPM | DIASTOLIC BLOOD PRESSURE: 60 MMHG

## 2021-06-10 DIAGNOSIS — R39.9 UTI SYMPTOMS: Primary | ICD-10-CM

## 2021-06-10 PROCEDURE — 3008F BODY MASS INDEX DOCD: CPT | Performed by: FAMILY MEDICINE

## 2021-06-10 PROCEDURE — 81003 URINALYSIS AUTO W/O SCOPE: CPT | Performed by: FAMILY MEDICINE

## 2021-06-10 PROCEDURE — 3074F SYST BP LT 130 MM HG: CPT | Performed by: FAMILY MEDICINE

## 2021-06-10 PROCEDURE — 99213 OFFICE O/P EST LOW 20 MIN: CPT | Performed by: FAMILY MEDICINE

## 2021-06-10 PROCEDURE — 3078F DIAST BP <80 MM HG: CPT | Performed by: FAMILY MEDICINE

## 2021-06-10 PROCEDURE — 87086 URINE CULTURE/COLONY COUNT: CPT | Performed by: FAMILY MEDICINE

## 2021-06-17 DIAGNOSIS — R31.29 MICROSCOPIC HEMATURIA: Primary | ICD-10-CM

## 2021-06-17 NOTE — PROGRESS NOTES
Chief Complaint:  Patient presents with:  UTI: Follow Up from [facility] 05-       HPI:  This is a 60year old female patient presenting for UTI (Follow Up from [facility] 05- )    Patient presented to urgent care on 5/30 for urinary symptoms.  At that time, Never used    Alcohol use: Yes      Alcohol/week: 0.0 standard drinks      Comment: occassional    Drug use: No       Current Outpatient Medications   Medication Sig Dispense Refill   • CRANBERRY OR Take by mouth.      • Albuterol Sulfate  (90 Base) Time: 06/10/21  4:18 PM    Specimen: Urine, clean catch   Result Value Ref Range    Urine Culture No Growth at 18-24 hrs.         ASSESSMENT AND PLAN:  Discussed the following assessment   Problem List Items Addressed This Visit     None      Visit Diagnose

## 2021-06-28 ENCOUNTER — PATIENT MESSAGE (OUTPATIENT)
Dept: FAMILY MEDICINE CLINIC | Facility: CLINIC | Age: 66
End: 2021-06-28

## 2021-06-28 DIAGNOSIS — B35.1 TOENAIL FUNGUS: Primary | ICD-10-CM

## 2021-06-28 NOTE — TELEPHONE ENCOUNTER
From: Symone Soria  To:  Antoine Jimenez DO  Sent: 6/28/2021 10:35 AM CDT  Subject: Referral Request    Hello,  I would like a referral to a podiatrist.  thank you,  Marla Artis

## 2021-06-29 NOTE — TELEPHONE ENCOUNTER
Pt prefers to see podiatry because she was treated with oral medication in the past and was unable to tolerate it. Referral entered for Dr Michelle Andrew. Contact info given to pt.

## 2021-06-29 NOTE — TELEPHONE ENCOUNTER
WE can see her here or if she wants to see someone, we can refer to Dr. Lexis Rojo. Please let me know if you have any questions.   Mortimer Lapidus, DO 6/29/2021 2:53 PM

## 2021-07-12 ENCOUNTER — TELEPHONE (OUTPATIENT)
Dept: FAMILY MEDICINE CLINIC | Facility: CLINIC | Age: 66
End: 2021-07-12

## 2021-07-12 ENCOUNTER — LAB ENCOUNTER (OUTPATIENT)
Dept: LAB | Facility: HOSPITAL | Age: 66
End: 2021-07-12
Attending: FAMILY MEDICINE
Payer: COMMERCIAL

## 2021-07-12 DIAGNOSIS — R31.29 MICROSCOPIC HEMATURIA: ICD-10-CM

## 2021-07-12 DIAGNOSIS — Z00.00 ROUTINE GENERAL MEDICAL EXAMINATION AT A HEALTH CARE FACILITY: Primary | ICD-10-CM

## 2021-07-12 LAB
BILIRUB UR QL STRIP.AUTO: NEGATIVE
CLARITY UR REFRACT.AUTO: CLEAR
GLUCOSE UR STRIP.AUTO-MCNC: NEGATIVE MG/DL
KETONES UR STRIP.AUTO-MCNC: NEGATIVE MG/DL
LEUKOCYTE ESTERASE UR QL STRIP.AUTO: NEGATIVE
NITRITE UR QL STRIP.AUTO: NEGATIVE
PH UR STRIP.AUTO: 6 [PH] (ref 5–8)
PROT UR STRIP.AUTO-MCNC: NEGATIVE MG/DL
RBC UR QL AUTO: NEGATIVE
SP GR UR STRIP.AUTO: 1.01 (ref 1–1.03)
UROBILINOGEN UR STRIP.AUTO-MCNC: <2 MG/DL

## 2021-07-12 PROCEDURE — 81001 URINALYSIS AUTO W/SCOPE: CPT

## 2021-07-12 NOTE — TELEPHONE ENCOUNTER
Please enter lab orders for the patient's upcoming physical appointment. Physical scheduled:    Your appointments     Date & Time Appointment Department [facility])    Oct 21, 2021  1:20 PM CDT Physical - Established with DO SHELDON Kent MICHELLE [facility]

## 2021-07-22 ENCOUNTER — OFFICE VISIT (OUTPATIENT)
Dept: PODIATRY CLINIC | Facility: CLINIC | Age: 66
End: 2021-07-22
Payer: COMMERCIAL

## 2021-07-22 DIAGNOSIS — B35.1 ONYCHOMYCOSIS: Primary | ICD-10-CM

## 2021-07-22 PROCEDURE — 99203 OFFICE O/P NEW LOW 30 MIN: CPT | Performed by: PODIATRIST

## 2021-07-22 NOTE — PROGRESS NOTES
Giles Gardner is a 60year old female. Patient presents with:  Consult:  Toenail fungus bilateral foot toes and left middle toe occasional numbness and pale -- Tried oral Lamisil a couple of years ago but had to stop after taking for 2 mths because sh Son       Social History    Socioeconomic History      Marital status:       Spouse name: Not on file      Number of children: Not on file      Years of education: Not on file      Highest education level: Not on file    Tobacco Use      Smoking sta HAIR, NAIL CULTURE; Future        Plan: I discussed different treatments my recommendation here would be Jublia I think it is the most effective we will see what the insurance company will allow us to do pending the nail culture results.   Discussed with abner

## 2021-08-09 ENCOUNTER — TELEPHONE (OUTPATIENT)
Dept: PODIATRY CLINIC | Facility: CLINIC | Age: 66
End: 2021-08-09

## 2021-08-09 NOTE — TELEPHONE ENCOUNTER
----- Message from Sarina Vernon DPM sent at 7/28/2021  4:38 PM CDT -----  Results reviewed. Please inform patient that fungal culture results are positive and we should proceed with Lamisil tablet therapy.   If the patient agrees we have to do a hepa

## 2021-08-11 NOTE — TELEPHONE ENCOUNTER
Dr Teresa Hernandez see message below and pt's allergy list and LOV note from 07/22/21. Pt has allergy to Lamisil.

## 2021-08-12 RX ORDER — EFINACONAZOLE 100 MG/ML
SOLUTION TOPICAL
Qty: 8 ML | Refills: 10 | Status: SHIPPED | OUTPATIENT
Start: 2021-08-12 | End: 2022-01-17

## 2021-08-12 NOTE — TELEPHONE ENCOUNTER
Please let the patient know that we have canceled her liver function test she does not need to do that in July she is on Lamisil.   In addition to that I have prescribed Jublia through the [facility] pharmacy system they may be calling her for insurance verific

## 2021-10-21 ENCOUNTER — OFFICE VISIT (OUTPATIENT)
Dept: FAMILY MEDICINE CLINIC | Facility: CLINIC | Age: 66
End: 2021-10-21
Payer: COMMERCIAL

## 2021-10-21 VITALS
HEART RATE: 76 BPM | RESPIRATION RATE: 14 BRPM | HEIGHT: 66 IN | DIASTOLIC BLOOD PRESSURE: 64 MMHG | SYSTOLIC BLOOD PRESSURE: 126 MMHG | BODY MASS INDEX: 20.28 KG/M2 | WEIGHT: 126.19 LBS

## 2021-10-21 DIAGNOSIS — Z23 NEED FOR VACCINATION: ICD-10-CM

## 2021-10-21 DIAGNOSIS — Z12.31 VISIT FOR SCREENING MAMMOGRAM: ICD-10-CM

## 2021-10-21 DIAGNOSIS — Z00.00 ROUTINE GENERAL MEDICAL EXAMINATION AT A HEALTH CARE FACILITY: Primary | ICD-10-CM

## 2021-10-21 PROCEDURE — 90715 TDAP VACCINE 7 YRS/> IM: CPT | Performed by: FAMILY MEDICINE

## 2021-10-21 PROCEDURE — 3008F BODY MASS INDEX DOCD: CPT | Performed by: FAMILY MEDICINE

## 2021-10-21 PROCEDURE — 3074F SYST BP LT 130 MM HG: CPT | Performed by: FAMILY MEDICINE

## 2021-10-21 PROCEDURE — 99396 PREV VISIT EST AGE 40-64: CPT | Performed by: FAMILY MEDICINE

## 2021-10-21 PROCEDURE — 3078F DIAST BP <80 MM HG: CPT | Performed by: FAMILY MEDICINE

## 2021-10-21 PROCEDURE — 90471 IMMUNIZATION ADMIN: CPT | Performed by: FAMILY MEDICINE

## 2021-10-21 NOTE — PROGRESS NOTES
SUBJECTIVE:  Patient presents with:  Physical: WWE, with pap     HPI:  Notes that she is still struggling with insomnia. Wakes up several times a night. Health Maintenance:  Vaccines: reviewed as below.  Indicated today:     Immunization History  Admi disturbance    • Umbilical hernia    • Umbilical hernia, incarcerated 10/14/2016   • Varicose vein of leg    • Wears glasses       Past Surgical History:   Procedure Laterality Date   • COLONOSCOPY WITH BIOPSY  1/22/15   • HERNIA SURGERY  06/15/2017    umb for Physical (WWE, with pap )    Problem List Items Addressed This Visit     None      Visit Diagnoses     Routine general medical examination at a health care facility    -  Primary    Relevant Orders    CBC WITH DIFFERENTIAL WITH PLATELET    COMP Amy Livingston

## 2021-10-28 ENCOUNTER — LAB ENCOUNTER (OUTPATIENT)
Dept: LAB | Age: 66
End: 2021-10-28
Attending: FAMILY MEDICINE
Payer: COMMERCIAL

## 2021-10-28 DIAGNOSIS — Z00.00 ROUTINE GENERAL MEDICAL EXAMINATION AT A HEALTH CARE FACILITY: ICD-10-CM

## 2021-10-28 PROCEDURE — 80050 GENERAL HEALTH PANEL: CPT | Performed by: FAMILY MEDICINE

## 2021-10-28 PROCEDURE — 80061 LIPID PANEL: CPT | Performed by: FAMILY MEDICINE

## 2021-10-30 ENCOUNTER — PATIENT MESSAGE (OUTPATIENT)
Dept: FAMILY MEDICINE CLINIC | Facility: CLINIC | Age: 66
End: 2021-10-30

## 2021-10-30 DIAGNOSIS — D70.9 NEUTROPENIA, UNSPECIFIED TYPE (HCC): Primary | ICD-10-CM

## 2021-10-30 NOTE — TELEPHONE ENCOUNTER
From: Symone Soria  To: Antoine Jimenez DO  Sent: 10/30/2021 6:28 AM CDT  Subject: Question regarding CBC W/ DIFFERENTIAL    Should my low WBC count be of concern? Do you recommend checking it again in a few months?

## 2021-11-17 ENCOUNTER — HOSPITAL ENCOUNTER (OUTPATIENT)
Dept: MAMMOGRAPHY | Age: 66
Discharge: HOME | End: 2021-11-17
Attending: FAMILY MEDICINE
Payer: MEDICARE

## 2021-11-17 DIAGNOSIS — Z12.31 VISIT FOR SCREENING MAMMOGRAM: ICD-10-CM

## 2021-11-17 PROCEDURE — 77067 SCR MAMMO BI INCL CAD: CPT | Performed by: FAMILY MEDICINE

## 2021-11-17 PROCEDURE — 77063 BREAST TOMOSYNTHESIS BI: CPT | Performed by: FAMILY MEDICINE

## 2022-01-13 ENCOUNTER — OFFICE VISIT (OUTPATIENT)
Dept: PODIATRY CLINIC | Facility: CLINIC | Age: 67
End: 2022-01-13
Payer: MEDICARE

## 2022-01-13 ENCOUNTER — PATIENT MESSAGE (OUTPATIENT)
Dept: FAMILY MEDICINE CLINIC | Facility: CLINIC | Age: 67
End: 2022-01-13

## 2022-01-13 VITALS — DIASTOLIC BLOOD PRESSURE: 64 MMHG | SYSTOLIC BLOOD PRESSURE: 92 MMHG

## 2022-01-13 DIAGNOSIS — M77.31 CALCANEAL SPUR OF RIGHT FOOT: ICD-10-CM

## 2022-01-13 DIAGNOSIS — M79.671 INFLAMMATORY HEEL PAIN, RIGHT: ICD-10-CM

## 2022-01-13 DIAGNOSIS — R68.84 MAXILLARY PAIN: ICD-10-CM
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
## 2022-01-13 DIAGNOSIS — M72.2 PLANTAR FASCIITIS OF RIGHT FOOT: Primary | ICD-10-CM

## 2022-01-13 PROCEDURE — 20550 NJX 1 TENDON SHEATH/LIGAMENT: CPT | Performed by: PODIATRIST

## 2022-01-13 RX ORDER — TRIAMCINOLONE ACETONIDE 40 MG/ML
40 INJECTION, SUSPENSION INTRA-ARTICULAR; INTRAMUSCULAR ONCE
Status: COMPLETED | OUTPATIENT
Start: 2022-01-13 | End: 2022-01-13

## 2022-01-13 NOTE — TELEPHONE ENCOUNTER
From: Yesi Gonzalez  To: Anamika Silveira DO  Sent: 1/13/2022 1:43 PM CST  Subject: Simple in [facility] after a fall    Hello, On 10/11/2021, I fell while running.  It happened so quickly, I didn’t brace my fall and the brunt of the fall was on my left

## 2022-01-13 NOTE — PROGRESS NOTES
Tonia Tuttle is a 73year old female. Patient presents with: Foot Pain: Pain in heel of right foot. 3/10 pain at this time. First thing in the morning pain level 8/10.          HPI:   Patient returns to the clinic she did not start on Lamisil garcia Brother    • Breast Cancer Paternal Aunt         70'S   • Other (sleep apnea) Son       Social History    Socioeconomic History      Marital status:     Tobacco Use      Smoking status: Never Smoker      Smokeless tobacco: Never Used    Vaping Use cortisone injection which the patient opted for. Today discussed the nature and extent of a cortisone injection as well as the possible complications and risks. Patient was in agreement to receive the injection.   The patient was consented for a cortisone i

## 2022-01-17 ENCOUNTER — OFFICE VISIT (OUTPATIENT)
Dept: FAMILY MEDICINE CLINIC | Facility: CLINIC | Age: 67
End: 2022-01-17
Payer: MEDICARE

## 2022-01-17 ENCOUNTER — TELEPHONE (OUTPATIENT)
Dept: FAMILY MEDICINE CLINIC | Facility: CLINIC | Age: 67
End: 2022-01-17

## 2022-01-17 VITALS
HEART RATE: 76 BPM | HEIGHT: 65.5 IN | RESPIRATION RATE: 16 BRPM | WEIGHT: 129.38 LBS | BODY MASS INDEX: 21.3 KG/M2 | DIASTOLIC BLOOD PRESSURE: 60 MMHG | SYSTOLIC BLOOD PRESSURE: 100 MMHG

## 2022-01-17 DIAGNOSIS — R30.0 DYSURIA: Primary | ICD-10-CM

## 2022-01-17 DIAGNOSIS — R39.9 UTI SYMPTOMS: ICD-10-CM

## 2022-01-17 LAB
BILIRUBIN: NEGATIVE
GLUCOSE (URINE DIPSTICK): NEGATIVE MG/DL
KETONES (URINE DIPSTICK): NEGATIVE MG/DL
LEUKOCYTES: NEGATIVE
MULTISTIX LOT#: ABNORMAL NUMERIC
NITRITE, URINE: NEGATIVE
PH, URINE: 6 (ref 4.5–8)
PROTEIN (URINE DIPSTICK): 30 MG/DL
SPECIFIC GRAVITY: 1.02 (ref 1–1.03)
URINE-COLOR: YELLOW
UROBILINOGEN,SEMI-QN: 0.2 MG/DL (ref 0–1.9)

## 2022-01-17 PROCEDURE — 81003 URINALYSIS AUTO W/O SCOPE: CPT | Performed by: NURSE PRACTITIONER

## 2022-01-17 PROCEDURE — 99213 OFFICE O/P EST LOW 20 MIN: CPT | Performed by: NURSE PRACTITIONER

## 2022-01-17 PROCEDURE — 87086 URINE CULTURE/COLONY COUNT: CPT | Performed by: NURSE PRACTITIONER

## 2022-01-17 RX ORDER — NITROFURANTOIN 25; 75 MG/1; MG/1
100 CAPSULE ORAL 2 TIMES DAILY
Qty: 14 CAPSULE | Refills: 0 | Status: SHIPPED | OUTPATIENT
Start: 2022-01-17

## 2022-01-17 NOTE — TELEPHONE ENCOUNTER
Has had UTIs in the past,feels like she has one now. Last night she had some burning with urination,an urge to urinate and a few spots of blood on toilet paper. She was experiencing some frequency this morning.  After checking with       Mahesh [place], made a

## 2022-01-17 NOTE — PROGRESS NOTES
Patient presents with:  UTI: started last night, was burning and a little worse this morning      HPI:  Presents with approx 1 day history of dysuria, urgency, frequency and noting some drops of blood on toilet paper after voiding, although did not have fr No erythema. No pallor. A/P:    Dysuria  (primary encounter diagnosis) -in office U/A suggestive of UTI. Will send for culture and start treatment with Nitrofurantoin today. Medication administration, use and side effects discussed.  Instructed to notif

## 2022-01-19 ENCOUNTER — HOSPITAL ENCOUNTER (OUTPATIENT)
Dept: CT IMAGING | Facility: HOSPITAL | Age: 67
Discharge: HOME | End: 2022-01-19
Attending: FAMILY MEDICINE
Payer: MEDICARE
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
## 2022-01-19 DIAGNOSIS — R68.84 MAXILLARY PAIN: ICD-10-CM

## 2022-01-19 PROCEDURE — 76376 3D RENDER W/INTRP POSTPROCES: CPT | Performed by: FAMILY MEDICINE

## 2022-01-19 PROCEDURE — 70486 CT MAXILLOFACIAL W/O DYE: CPT | Performed by: FAMILY MEDICINE

## 2022-02-16 ENCOUNTER — TELEPHONE (OUTPATIENT)
Dept: FAMILY MEDICINE CLINIC | Facility: CLINIC | Age: 67
End: 2022-02-16

## 2022-02-16 ENCOUNTER — HOSPITAL ENCOUNTER (OUTPATIENT)
Age: 67
Discharge: HOME | End: 2022-02-16
Payer: MEDICARE

## 2022-02-16 VITALS
SYSTOLIC BLOOD PRESSURE: 126 MMHG | BODY MASS INDEX: 20.41 KG/M2 | HEART RATE: 76 BPM | RESPIRATION RATE: 18 BRPM | WEIGHT: 127 LBS | OXYGEN SATURATION: 100 % | TEMPERATURE: 99 F | HEIGHT: 66 IN | DIASTOLIC BLOOD PRESSURE: 88 MMHG

## 2022-02-16 DIAGNOSIS — N30.01 ACUTE CYSTITIS WITH HEMATURIA: Primary | ICD-10-CM

## 2022-02-16 DIAGNOSIS — R30.0 DYSURIA: ICD-10-CM

## 2022-02-16 LAB
POCT BILIRUBIN URINE: NEGATIVE
POCT GLUCOSE URINE: NEGATIVE MG/DL
POCT KETONE URINE: NEGATIVE MG/DL
POCT NITRITE URINE: NEGATIVE
POCT PH URINE: 6.5 (ref 5–8)
POCT SPECIFIC GRAVITY URINE: 1.01
POCT UROBILINOGEN URINE: 0.2 MG/DL

## 2022-02-16 PROCEDURE — 81002 URINALYSIS NONAUTO W/O SCOPE: CPT | Performed by: PHYSICIAN ASSISTANT

## 2022-02-16 PROCEDURE — 99213 OFFICE O/P EST LOW 20 MIN: CPT | Performed by: PHYSICIAN ASSISTANT

## 2022-02-16 RX ORDER — CEPHALEXIN 500 MG/1
500 CAPSULE ORAL 2 TIMES DAILY
Qty: 14 CAPSULE | Refills: 0 | Status: SHIPPED | OUTPATIENT
Start: 2022-02-16 | End: 2022-02-23

## 2022-02-16 RX ORDER — PHENAZOPYRIDINE HYDROCHLORIDE 100 MG/1
100 TABLET, FILM COATED ORAL 3 TIMES DAILY PRN
Qty: 6 TABLET | Refills: 0 | Status: SHIPPED | OUTPATIENT
Start: 2022-02-16 | End: 2022-02-23

## 2022-02-16 NOTE — TELEPHONE ENCOUNTER
Patient is calling she said she has blood in her urine again she was just in last week.  Please call

## 2022-02-16 NOTE — TELEPHONE ENCOUNTER
Call made to discuss. Symptoms started this morning. Initially felt a \"twinge\". Has since progressed. She has blood in urine; seen in toilet, not just when wiping. Does not feel well. Chills, discomfort, nauseated. No history of kidney stones. Reports kidney infection 10+ years ago and had sepsis. Symptoms have been consistent this morning. Seen in office 1/17 for UTI symptoms. Treated with macrobid but culture was negative. To urgent care for new or worsening symptoms. Patient verbalized understanding of information given. To Toby Paulino - patient asking if urine testing can be done? You do have a 15 min slot open today. Or should patient just go to ?

## 2022-02-16 NOTE — TELEPHONE ENCOUNTER
Call made to patient. She had already went to [facility] and was treated. Will be scheduling with urology. Nothing is needed by this office at this time.

## 2022-02-17 ENCOUNTER — TELEPHONE (OUTPATIENT)
Dept: PODIATRY CLINIC | Facility: CLINIC | Age: 67
End: 2022-02-17

## 2022-02-17 NOTE — TELEPHONE ENCOUNTER
I called the patient because of the lab that evidently got submitted from our office and was never done. I reviewed my note for that date in question and there was no clipping of her nail ever taken how that got sent and I told her I do not know we will have to look into that I apologized about it and told her she would not be held responsible for that charge. In the meantime I did ask her about her right heel which she presented with she still having some pain and discomfort and I talked her about that and she wants to get physical therapy so I prescribed physical therapy and advised her to return to clinic in a month for a checkup.

## 2022-03-10 ENCOUNTER — TELEPHONE (OUTPATIENT)
Dept: PODIATRY CLINIC | Facility: CLINIC | Age: 67
End: 2022-03-10

## 2022-03-10 NOTE — TELEPHONE ENCOUNTER
From: Woody Jiménez  Sent: 3/10/2022 3:43 PM CST  To: [facility] Clinical Staff  Subject: Dr. Fernando Guerrero visits    Thank you. I do see that my insurance was billed and paid for the service I did not receive. How will that be handled?

## 2022-04-05 ENCOUNTER — OFFICE VISIT (OUTPATIENT)
Dept: PHYSICAL THERAPY | Age: 67
End: 2022-04-05
Attending: PODIATRIST
Payer: MEDICARE

## 2022-04-05 DIAGNOSIS — M72.2 PLANTAR FASCIITIS OF RIGHT FOOT: ICD-10-CM

## 2022-04-05 DIAGNOSIS — M77.31 CALCANEAL SPUR OF RIGHT FOOT: ICD-10-CM

## 2022-04-05 DIAGNOSIS — M79.671 INFLAMMATORY HEEL PAIN, RIGHT: ICD-10-CM

## 2022-04-05 PROCEDURE — 97110 THERAPEUTIC EXERCISES: CPT

## 2022-04-05 PROCEDURE — 97161 PT EVAL LOW COMPLEX 20 MIN: CPT

## 2022-04-07 ENCOUNTER — APPOINTMENT (OUTPATIENT)
Dept: PHYSICAL THERAPY | Age: 67
End: 2022-04-07
Attending: PODIATRIST
Payer: MEDICARE

## 2022-04-12 ENCOUNTER — APPOINTMENT (OUTPATIENT)
Dept: PHYSICAL THERAPY | Age: 67
End: 2022-04-12
Attending: PODIATRIST
Payer: MEDICARE

## 2022-04-14 ENCOUNTER — APPOINTMENT (OUTPATIENT)
Dept: PHYSICAL THERAPY | Age: 67
End: 2022-04-14
Attending: PODIATRIST
Payer: MEDICARE

## 2022-04-19 ENCOUNTER — OFFICE VISIT (OUTPATIENT)
Dept: PHYSICAL THERAPY | Age: 67
End: 2022-04-19
Attending: PODIATRIST
Payer: MEDICARE

## 2022-04-19 PROCEDURE — 97140 MANUAL THERAPY 1/> REGIONS: CPT

## 2022-04-19 PROCEDURE — 97110 THERAPEUTIC EXERCISES: CPT

## 2022-04-28 ENCOUNTER — APPOINTMENT (OUTPATIENT)
Dept: PHYSICAL THERAPY | Age: 67
End: 2022-04-28
Attending: PODIATRIST
Payer: MEDICARE

## 2022-05-03 ENCOUNTER — APPOINTMENT (OUTPATIENT)
Dept: PHYSICAL THERAPY | Age: 67
End: 2022-05-03
Attending: PODIATRIST
Payer: MEDICARE

## 2022-05-05 ENCOUNTER — APPOINTMENT (OUTPATIENT)
Dept: PHYSICAL THERAPY | Age: 67
End: 2022-05-05
Attending: PODIATRIST
Payer: MEDICARE

## 2022-05-17 ENCOUNTER — DOCUMENTATION ONLY (OUTPATIENT)
Dept: PHYSICAL THERAPY | Age: 67
End: 2022-05-17

## 2022-05-17 NOTE — PROGRESS NOTES
Discharge Summary    Pt has attended 2 visits in Physical Therapy with their last visit 4/19/22    Pt called to cancel her remaining visits. Pt is therefore d/c at this time. Please refer to daily note from 4/19/22 for further pt assessment. Thank you for your referral. If you have any questions, please contact me at Dept: [phone].     Sincerely,  Electronically signed by therapist: Sumaya Hatch PT     Physician's certification required:  No

## 2022-05-26 ENCOUNTER — PATIENT MESSAGE (OUTPATIENT)
Dept: FAMILY MEDICINE CLINIC | Facility: CLINIC | Age: 67
End: 2022-05-26

## 2022-05-27 NOTE — TELEPHONE ENCOUNTER
Doesn't appear in notes that urology is recommending eval with nephrology so will need visit for referral. Thanks.

## 2022-05-27 NOTE — TELEPHONE ENCOUNTER
Keena Redman see note from patient, last seen by you in January of 2022. Okay for referral or should patient be seen?

## 2022-05-31 ENCOUNTER — PATIENT MESSAGE (OUTPATIENT)
Dept: FAMILY MEDICINE CLINIC | Facility: CLINIC | Age: 67
End: 2022-05-31

## 2022-05-31 ENCOUNTER — OFFICE VISIT (OUTPATIENT)
Dept: FAMILY MEDICINE CLINIC | Facility: CLINIC | Age: 67
End: 2022-05-31
Payer: MEDICARE

## 2022-05-31 VITALS
HEIGHT: 65.25 IN | TEMPERATURE: 99 F | BODY MASS INDEX: 20.44 KG/M2 | SYSTOLIC BLOOD PRESSURE: 94 MMHG | WEIGHT: 124.19 LBS | RESPIRATION RATE: 16 BRPM | HEART RATE: 68 BPM | DIASTOLIC BLOOD PRESSURE: 62 MMHG

## 2022-05-31 DIAGNOSIS — N39.0 RECURRENT UTI: Primary | ICD-10-CM

## 2022-05-31 PROCEDURE — 99213 OFFICE O/P EST LOW 20 MIN: CPT | Performed by: FAMILY MEDICINE

## 2022-05-31 RX ORDER — SULFAMETHOXAZOLE AND TRIMETHOPRIM 800; 160 MG/1; MG/1
1 TABLET ORAL 2 TIMES DAILY
COMMUNITY
Start: 2022-05-24 | End: 2022-06-06

## 2022-05-31 RX ORDER — PHENAZOPYRIDINE HYDROCHLORIDE 200 MG/1
1 TABLET, FILM COATED ORAL 3 TIMES DAILY PRN
COMMUNITY
Start: 2022-05-23

## 2022-05-31 NOTE — TELEPHONE ENCOUNTER
From: Cristhian Morales  To: Francis Kurtz DO  Sent: 5/31/2022 3:00 PM CDT  Subject: Covid 19 vaccination record    Your nurse recommended that I send a photo of my vaccination card.      Constantino Corado

## 2022-08-09 ENCOUNTER — TELEPHONE (OUTPATIENT)
Dept: FAMILY MEDICINE CLINIC | Facility: CLINIC | Age: 67
End: 2022-08-09

## 2022-08-09 DIAGNOSIS — Z00.00 ROUTINE HEALTH MAINTENANCE: Primary | ICD-10-CM

## 2022-08-09 NOTE — TELEPHONE ENCOUNTER
Please enter lab orders for the patient's upcoming physical appointment. Physical scheduled: Your appointments     Date & Time Appointment Department [facility])    Oct 13, 2022  1:40 PM CDT Medicare Annual Well Visit with Felisha Rojas DO [facility], [address], [place]  (Jasmyne Cantor)            [facility], [address], [place] [address] [place] [id number] [phone]         Preferred lab: [address] [facility])     The patient has been notified to complete fasting labs prior to their physical appointment.

## 2022-08-16 ENCOUNTER — OFFICE VISIT (OUTPATIENT)
Dept: FAMILY MEDICINE CLINIC | Facility: CLINIC | Age: 67
End: 2022-08-16
Payer: MEDICARE

## 2022-08-16 VITALS
SYSTOLIC BLOOD PRESSURE: 120 MMHG | OXYGEN SATURATION: 98 % | DIASTOLIC BLOOD PRESSURE: 60 MMHG | BODY MASS INDEX: 19.92 KG/M2 | RESPIRATION RATE: 16 BRPM | HEART RATE: 80 BPM | TEMPERATURE: 99 F | WEIGHT: 121 LBS | HEIGHT: 65.25 IN

## 2022-08-16 DIAGNOSIS — R30.0 DYSURIA: Primary | ICD-10-CM

## 2022-08-16 DIAGNOSIS — R31.0 GROSS HEMATURIA: ICD-10-CM

## 2022-08-16 LAB
APPEARANCE: CLEAR
BILIRUBIN: NEGATIVE
GLUCOSE (URINE DIPSTICK): NEGATIVE MG/DL
KETONES (URINE DIPSTICK): NEGATIVE MG/DL
MULTISTIX LOT#: ABNORMAL NUMERIC
NITRITE, URINE: NEGATIVE
PH, URINE: 6 (ref 4.5–8)
PROTEIN (URINE DIPSTICK): NEGATIVE MG/DL
SPECIFIC GRAVITY: 1 (ref 1–1.03)
URINE-COLOR: YELLOW
UROBILINOGEN,SEMI-QN: 0.2 MG/DL (ref 0–1.9)

## 2022-08-16 PROCEDURE — 99214 OFFICE O/P EST MOD 30 MIN: CPT | Performed by: FAMILY MEDICINE

## 2022-08-16 PROCEDURE — 87086 URINE CULTURE/COLONY COUNT: CPT | Performed by: FAMILY MEDICINE

## 2022-08-16 PROCEDURE — 81003 URINALYSIS AUTO W/O SCOPE: CPT | Performed by: FAMILY MEDICINE

## 2022-08-16 RX ORDER — SULFAMETHOXAZOLE AND TRIMETHOPRIM 800; 160 MG/1; MG/1
1 TABLET ORAL 2 TIMES DAILY
Qty: 14 TABLET | Refills: 0 | Status: SHIPPED | OUTPATIENT
Start: 2022-08-16

## 2022-08-30 ENCOUNTER — TELEPHONE (OUTPATIENT)
Dept: FAMILY MEDICINE CLINIC | Facility: CLINIC | Age: 67
End: 2022-08-30

## 2022-08-30 DIAGNOSIS — N89.8 VAGINAL PRURITUS: Primary | ICD-10-CM

## 2022-08-30 NOTE — TELEPHONE ENCOUNTER
Called has occasional discomfort denies vaginal drainage some vaginal itching, no foul smell. Urine normal color now clear no pain with urination no back pain.  Patient feels she might have a yeast infection but she has never had one before

## 2022-08-30 NOTE — TELEPHONE ENCOUNTER
Can we clarify what symptoms she is exerpiencing? Still dark urine? abd pain with vaginal discharge? Fevers? Back pain?   Thanks,  Jacquelyn Sit, DO

## 2022-08-31 ENCOUNTER — TELEPHONE (OUTPATIENT)
Dept: FAMILY MEDICINE CLINIC | Facility: CLINIC | Age: 67
End: 2022-08-31

## 2022-08-31 NOTE — TELEPHONE ENCOUNTER
----- Message from Kirit Marinelli, [address] sent at 8/26/2022  2:38 PM CDT -----  Please order pelvic US per Dr. Brooke Allen as next steps for negative urine.

## 2022-08-31 NOTE — TELEPHONE ENCOUNTER
Discussed urine culture results of no growth in 18-24 hours with Yuval Keen by phone. Yuval Keen no longer has sxs of elevated temperature and discharge. She does have an itching and burning in the same area throughout the day,but it does not keep her awake present about 2 weeks. The burning is not with voiding. .At first she thought it was a UTI. Her urine is clear,no odor and no discomfort with voiding. 1 1/2 weeks before any symptoms started, she was taking prednisone for a foot problem. Please enter order if you would like Yuval Keen to have a Pelvic US,due to a number of choices.   Routed to Tino Woody

## 2022-09-01 RX ORDER — FLUCONAZOLE 150 MG/1
150 TABLET ORAL ONCE
Qty: 2 TABLET | Refills: 0 | Status: SHIPPED | OUTPATIENT
Start: 2022-09-01 | End: 2022-09-01

## 2022-09-01 NOTE — TELEPHONE ENCOUNTER
Please see previous note- let's try the diflucan and if no improvement, I will see her back in clinic.      Thanks,  Zander Boss, DO

## 2022-10-05 ENCOUNTER — LAB ENCOUNTER (OUTPATIENT)
Dept: LAB | Age: 67
End: 2022-10-05
Attending: FAMILY MEDICINE
Payer: MEDICARE

## 2022-10-05 DIAGNOSIS — D70.9 NEUTROPENIA, UNSPECIFIED TYPE (HCC): ICD-10-CM

## 2022-10-05 DIAGNOSIS — Z00.00 ROUTINE HEALTH MAINTENANCE: ICD-10-CM

## 2022-10-05 LAB
ALBUMIN SERPL-MCNC: 3.5 G/DL (ref 3.4–5)
ALBUMIN/GLOB SERPL: 1 {RATIO} (ref 1–2)
ALP LIVER SERPL-CCNC: 82 U/L
ALT SERPL-CCNC: 20 U/L
ANION GAP SERPL CALC-SCNC: 4 MMOL/L (ref 0–18)
AST SERPL-CCNC: 16 U/L (ref 15–37)
BASOPHILS # BLD AUTO: 0.03 X10(3) UL (ref 0–0.2)
BASOPHILS NFR BLD AUTO: 0.9 %
BILIRUB SERPL-MCNC: 0.6 MG/DL (ref 0.1–2)
BUN BLD-MCNC: 22 MG/DL (ref 7–18)
CALCIUM BLD-MCNC: 9.8 MG/DL (ref 8.5–10.1)
CHLORIDE SERPL-SCNC: 107 MMOL/L (ref 98–112)
CHOLEST SERPL-MCNC: 232 MG/DL (ref ?–200)
CO2 SERPL-SCNC: 29 MMOL/L (ref 21–32)
CREAT BLD-MCNC: 0.62 MG/DL
EOSINOPHIL # BLD AUTO: 0.13 X10(3) UL (ref 0–0.7)
EOSINOPHIL NFR BLD AUTO: 3.9 %
ERYTHROCYTE [DISTWIDTH] IN BLOOD BY AUTOMATED COUNT: 12.7 %
FASTING PATIENT LIPID ANSWER: YES
FASTING STATUS PATIENT QL REPORTED: YES
GFR SERPLBLD BASED ON 1.73 SQ M-ARVRAT: 99 ML/MIN/1.73M2 (ref 60–?)
GLOBULIN PLAS-MCNC: 3.4 G/DL (ref 2.8–4.4)
GLUCOSE BLD-MCNC: 91 MG/DL (ref 70–99)
HCT VFR BLD AUTO: 41.6 %
HDLC SERPL-MCNC: 99 MG/DL (ref 40–59)
HGB BLD-MCNC: 13.6 G/DL
IMM GRANULOCYTES # BLD AUTO: 0.01 X10(3) UL (ref 0–1)
IMM GRANULOCYTES NFR BLD: 0.3 %
LDLC SERPL CALC-MCNC: 121 MG/DL (ref ?–100)
LYMPHOCYTES # BLD AUTO: 1.2 X10(3) UL (ref 1–4)
LYMPHOCYTES NFR BLD AUTO: 35.7 %
MCH RBC QN AUTO: 30.7 PG (ref 26–34)
MCHC RBC AUTO-ENTMCNC: 32.7 G/DL (ref 31–37)
MCV RBC AUTO: 93.9 FL
MONOCYTES # BLD AUTO: 0.39 X10(3) UL (ref 0.1–1)
MONOCYTES NFR BLD AUTO: 11.6 %
NEUTROPHILS # BLD AUTO: 1.6 X10 (3) UL (ref 1.5–7.7)
NEUTROPHILS # BLD AUTO: 1.6 X10(3) UL (ref 1.5–7.7)
NEUTROPHILS NFR BLD AUTO: 47.6 %
NONHDLC SERPL-MCNC: 133 MG/DL (ref ?–130)
OSMOLALITY SERPL CALC.SUM OF ELEC: 293 MOSM/KG (ref 275–295)
PLATELET # BLD AUTO: 184 10(3)UL (ref 150–450)
POTASSIUM SERPL-SCNC: 4 MMOL/L (ref 3.5–5.1)
PROT SERPL-MCNC: 6.9 G/DL (ref 6.4–8.2)
RBC # BLD AUTO: 4.43 X10(6)UL
SODIUM SERPL-SCNC: 140 MMOL/L (ref 136–145)
TRIGL SERPL-MCNC: 70 MG/DL (ref 30–149)
VLDLC SERPL CALC-MCNC: 12 MG/DL (ref 0–30)
WBC # BLD AUTO: 3.4 X10(3) UL (ref 4–11)

## 2022-10-05 PROCEDURE — 80053 COMPREHEN METABOLIC PANEL: CPT

## 2022-10-05 PROCEDURE — 36415 COLL VENOUS BLD VENIPUNCTURE: CPT

## 2022-10-05 PROCEDURE — 80061 LIPID PANEL: CPT

## 2022-10-05 PROCEDURE — 85025 COMPLETE CBC W/AUTO DIFF WBC: CPT

## 2022-10-13 ENCOUNTER — OFFICE VISIT (OUTPATIENT)
Dept: FAMILY MEDICINE CLINIC | Facility: CLINIC | Age: 67
End: 2022-10-13
Payer: MEDICARE

## 2022-10-13 VITALS
DIASTOLIC BLOOD PRESSURE: 60 MMHG | SYSTOLIC BLOOD PRESSURE: 110 MMHG | HEART RATE: 70 BPM | HEIGHT: 65.25 IN | TEMPERATURE: 98 F | WEIGHT: 125 LBS | BODY MASS INDEX: 20.58 KG/M2 | OXYGEN SATURATION: 98 % | RESPIRATION RATE: 16 BRPM

## 2022-10-13 DIAGNOSIS — N94.10 DYSPAREUNIA IN FEMALE: ICD-10-CM

## 2022-10-13 DIAGNOSIS — Z00.00 ENCOUNTER FOR ANNUAL HEALTH EXAMINATION: Primary | ICD-10-CM

## 2022-10-13 DIAGNOSIS — D70.9 NEUTROPENIA, UNSPECIFIED TYPE (HCC): ICD-10-CM

## 2022-10-13 DIAGNOSIS — Z23 NEED FOR VACCINATION: ICD-10-CM

## 2022-10-13 DIAGNOSIS — R06.2 WHEEZING: ICD-10-CM

## 2022-10-13 DIAGNOSIS — N95.1 MENOPAUSAL VAGINAL DRYNESS: ICD-10-CM

## 2022-10-13 DIAGNOSIS — Z12.31 VISIT FOR SCREENING MAMMOGRAM: ICD-10-CM

## 2022-10-13 DIAGNOSIS — Z78.0 POSTMENOPAUSAL: ICD-10-CM

## 2022-10-13 RX ORDER — ESTRADIOL 0.1 MG/G
CREAM VAGINAL
Qty: 42.5 G | Refills: 2 | Status: SHIPPED | OUTPATIENT
Start: 2022-10-13

## 2022-10-13 RX ORDER — ALBUTEROL SULFATE 90 UG/1
2 AEROSOL, METERED RESPIRATORY (INHALATION) EVERY 6 HOURS PRN
Qty: 18 G | Refills: 2 | Status: SHIPPED | OUTPATIENT
Start: 2022-10-13

## 2022-10-25 ENCOUNTER — TELEPHONE (OUTPATIENT)
Dept: FAMILY MEDICINE CLINIC | Facility: CLINIC | Age: 67
End: 2022-10-25

## 2022-10-25 DIAGNOSIS — N95.0 POSTMENOPAUSAL BLEEDING: Primary | ICD-10-CM

## 2022-10-25 DIAGNOSIS — R82.998 DARK URINE: ICD-10-CM

## 2022-10-25 NOTE — TELEPHONE ENCOUNTER
Patient is post menopausal, having problem with cramping and discharge, not sure what's going on.     Recently given a prescription for Estradiol and has questions on it

## 2022-10-25 NOTE — TELEPHONE ENCOUNTER
Called patient and yesterday started to ha abd cramping and brownish discharge. Had the same thing a couple of months ago. has not started the estradiol yet, I told her to hold off until we do something about the drainage. The cramping is a dull ache 2-3 out of 10.  Wondering what she should be doing if anything

## 2022-10-26 NOTE — TELEPHONE ENCOUNTER
Called and talked to the patient told her that Dr Kapil Chen is not in the office today but will be here tomorrow and will defiantly address this then

## 2022-10-26 NOTE — TELEPHONE ENCOUNTER
Patient called said has not received a call back. Pt was made aware Dr. Carmina Conrad is not in the office today. Pt asked if a nurse can call her back today.

## 2022-10-27 NOTE — TELEPHONE ENCOUNTER
Called and talked to patient and told her of orders. She said the drainage has stopped but she will get the UA and schedule the US to be done.

## 2022-10-27 NOTE — TELEPHONE ENCOUNTER
LEt's get a UA and also a pelvic US. We need to r/o postmenopausal bleeding given second occurrence. Agree hold on topical estrogen. Please let me know if you have any questions.   [id number], DO 10/27/2022 8:14 AM

## 2022-10-31 ENCOUNTER — LAB ENCOUNTER (OUTPATIENT)
Dept: LAB | Age: 67
End: 2022-10-31
Attending: FAMILY MEDICINE
Payer: MEDICARE

## 2022-10-31 DIAGNOSIS — R82.998 DARK URINE: ICD-10-CM

## 2022-10-31 LAB
BILIRUB UR QL STRIP.AUTO: NEGATIVE
CLARITY UR REFRACT.AUTO: CLEAR
COLOR UR AUTO: COLORLESS
GLUCOSE UR STRIP.AUTO-MCNC: NEGATIVE MG/DL
KETONES UR STRIP.AUTO-MCNC: NEGATIVE MG/DL
LEUKOCYTE ESTERASE UR QL STRIP.AUTO: NEGATIVE
NITRITE UR QL STRIP.AUTO: NEGATIVE
PH UR STRIP.AUTO: 7 [PH] (ref 5–8)
PROT UR STRIP.AUTO-MCNC: NEGATIVE MG/DL
SP GR UR STRIP.AUTO: 1 (ref 1–1.03)
UROBILINOGEN UR STRIP.AUTO-MCNC: <2 MG/DL

## 2022-10-31 PROCEDURE — 81001 URINALYSIS AUTO W/SCOPE: CPT

## 2022-11-16 ENCOUNTER — HOSPITAL ENCOUNTER (OUTPATIENT)
Dept: ULTRASOUND IMAGING | Age: 67
Discharge: HOME | End: 2022-11-16
Attending: FAMILY MEDICINE
Payer: MEDICARE

## 2022-11-16 DIAGNOSIS — N95.0 POSTMENOPAUSAL BLEEDING: ICD-10-CM

## 2022-11-16 PROCEDURE — 76830 TRANSVAGINAL US NON-OB: CPT | Performed by: FAMILY MEDICINE

## 2022-11-16 PROCEDURE — 76856 US EXAM PELVIC COMPLETE: CPT | Performed by: FAMILY MEDICINE

## 2022-11-18 ENCOUNTER — TELEPHONE (OUTPATIENT)
Dept: FAMILY MEDICINE CLINIC | Facility: CLINIC | Age: 67
End: 2022-11-18

## 2022-11-21 ENCOUNTER — HOSPITAL ENCOUNTER (OUTPATIENT)
Dept: MAMMOGRAPHY | Age: 67
Discharge: HOME | End: 2022-11-21
Attending: FAMILY MEDICINE
Payer: MEDICARE

## 2022-11-21 ENCOUNTER — HOSPITAL ENCOUNTER (OUTPATIENT)
Dept: BONE DENSITY | Age: 67
Discharge: HOME | End: 2022-11-21
Attending: FAMILY MEDICINE
Payer: MEDICARE

## 2022-11-21 DIAGNOSIS — Z78.0 POSTMENOPAUSAL: ICD-10-CM

## 2022-11-21 DIAGNOSIS — Z12.31 VISIT FOR SCREENING MAMMOGRAM: ICD-10-CM

## 2022-11-21 PROCEDURE — 77080 DXA BONE DENSITY AXIAL: CPT | Performed by: FAMILY MEDICINE

## 2022-11-21 PROCEDURE — 77067 SCR MAMMO BI INCL CAD: CPT | Performed by: FAMILY MEDICINE

## 2022-11-21 PROCEDURE — 77063 BREAST TOMOSYNTHESIS BI: CPT | Performed by: FAMILY MEDICINE

## 2022-11-22 ENCOUNTER — PATIENT MESSAGE (OUTPATIENT)
Dept: FAMILY MEDICINE CLINIC | Facility: CLINIC | Age: 67
End: 2022-11-22

## 2022-11-23 NOTE — TELEPHONE ENCOUNTER
[phone] From: Jaison Telles  To: Riya Otto DO  Sent: 11/22/2022 10:26 PM CST  Subject: Question regarding US PELVIS W EV (XAY=[id number])    The earliest appointment that I could get with Dr Harleen Merida is in January. I am on a waitlist if something comes up earlier. Should this length of time before a biopsy be a concern?

## 2022-11-29 PROBLEM — N95.0 POSTMENOPAUSAL VAGINAL BLEEDING: Status: ACTIVE | Noted: 2022-10-25

## 2022-11-29 PROBLEM — R93.89 THICKENED ENDOMETRIUM: Status: ACTIVE | Noted: 2022-11-16

## 2022-12-02 NOTE — TELEPHONE ENCOUNTER
L/m to call regarding scheduling ablation of greater saphenous vein left only in our office.   Pt needs to pick date
friend

## 2022-12-06 ENCOUNTER — OFFICE VISIT (OUTPATIENT)
Facility: CLINIC | Age: 67
End: 2022-12-06
Payer: MEDICARE

## 2022-12-06 DIAGNOSIS — N95.0 POSTMENOPAUSAL BLEEDING: ICD-10-CM

## 2022-12-06 DIAGNOSIS — R93.89 THICKENED ENDOMETRIUM: ICD-10-CM

## 2022-12-06 PROCEDURE — 88305 TISSUE EXAM BY PATHOLOGIST: CPT | Performed by: STUDENT IN AN ORGANIZED HEALTH CARE EDUCATION/TRAINING PROGRAM

## 2022-12-06 PROCEDURE — 58100 BIOPSY OF UTERUS LINING: CPT | Performed by: STUDENT IN AN ORGANIZED HEALTH CARE EDUCATION/TRAINING PROGRAM

## 2022-12-06 NOTE — PROGRESS NOTES
2022    Procedure: Endometrial biopsy    Pre-op Dx: postmenopausal spotting, thickened endometrium (7mm on US)    Post-op Dx: Same    Indication: 78year old  postmenopausal female with intermittent brownish vaginal discharge, pelvic US done by PCP showing 7mm endometrial lining. Risks, benefits, alternatives discussed. Speculum placed. Cervix swabbed with betadine x 3   Tenaculum applied to anterior lip of cervix  Cervical canal not stenotic  Pipelle inserted without difficulty to 7 cm  Multiple passes made with small amount tissue obtained. Pipelle & tenaculum removed  Hemostatic tenaculum sites  Speculum removed. Tolerated well  EBL minimal  Specimen: endometrial biopsy    Discussed possibility that specimen may not be adequate for path analysis, we discussed if this is the case then would recommend hysteroscopy with D&C. Briefly reviewed risks and benefits of hysteroscopy. Will follow up by phone after path results.     Cuca Jones MD

## 2022-12-15 ENCOUNTER — TELEPHONE (OUTPATIENT)
Facility: CLINIC | Age: 67
End: 2022-12-15

## 2022-12-15 NOTE — TELEPHONE ENCOUNTER
Spoke with pt. She is looking for her EMB results & recommendations. I let her know I would route to Dr. eJnnifer Christensen and call with any recommendations. Verbalized understanding.

## 2022-12-28 PROBLEM — Z12.4 SCREENING FOR CERVICAL CANCER: Status: ACTIVE | Noted: 2022-12-28

## 2022-12-28 PROBLEM — Z12.39 SCREENING BREAST EXAMINATION: Status: ACTIVE | Noted: 2022-12-28

## 2023-09-29 ENCOUNTER — TELEPHONE (OUTPATIENT)
Dept: FAMILY MEDICINE CLINIC | Facility: CLINIC | Age: 68
End: 2023-09-29

## 2023-09-29 DIAGNOSIS — Z13.21 ENCOUNTER FOR VITAMIN DEFICIENCY SCREENING: ICD-10-CM

## 2023-09-29 DIAGNOSIS — Z13.6 SCREENING FOR CARDIOVASCULAR CONDITION: ICD-10-CM

## 2023-09-29 DIAGNOSIS — Z00.00 ROUTINE HEALTH MAINTENANCE: ICD-10-CM

## 2023-09-29 DIAGNOSIS — D70.8 OTHER NEUTROPENIA (HCC): Primary | ICD-10-CM

## 2023-09-29 DIAGNOSIS — E55.9 VITAMIN D DEFICIENCY: ICD-10-CM

## 2023-09-29 NOTE — TELEPHONE ENCOUNTER
Please enter lab orders for the patient's upcoming physical appointment. Physical scheduled: Your appointments       Date & Time Appointment Department [facility])    Dec 08, 2023  7:40 AM CST Medicare Annual Well [facility] [address], [place] ([address])              [address] Lexi Alas [address] [phone]           Preferred lab: [address] [facility])     The patient has been notified to complete fasting labs prior to their physical appointment.

## 2023-11-20 ENCOUNTER — TELEPHONE (OUTPATIENT)
Dept: FAMILY MEDICINE CLINIC | Facility: CLINIC | Age: 68
End: 2023-11-20

## 2023-11-20 DIAGNOSIS — Z12.31 BREAST CANCER SCREENING BY MAMMOGRAM: Primary | ICD-10-CM

## 2023-11-20 NOTE — TELEPHONE ENCOUNTER
LOV 10/13/2022  Future Appointments   Date Time Provider Mayelin De La Torre   11/21/2023  7:15 AM REF MOB REF MOB EDW Ref MOB   12/8/2023  7:40 AM Cheryl Savage DO [facility] 3 [facility] Sara      Mammogram ordered.

## 2023-11-20 NOTE — TELEPHONE ENCOUNTER
Patient is requesting an order for her annual screening mammogram.    Patient has been notified to allow 2-3 business days for order placement. Reviewed with patient that she may schedule mammogram via [employer] or by calling Central Scheduling at that time. Request for mammogram order routed to triage.

## 2023-11-21 ENCOUNTER — LAB ENCOUNTER (OUTPATIENT)
Dept: LAB | Age: 68
End: 2023-11-21
Attending: FAMILY MEDICINE
Payer: MEDICARE

## 2023-11-21 LAB
ALBUMIN SERPL-MCNC: 3.6 G/DL (ref 3.4–5)
ALBUMIN/GLOB SERPL: 1 {RATIO} (ref 1–2)
ALP LIVER SERPL-CCNC: 76 U/L
ALT SERPL-CCNC: 18 U/L
ANION GAP SERPL CALC-SCNC: 5 MMOL/L (ref 0–18)
AST SERPL-CCNC: 17 U/L (ref 15–37)
BASOPHILS # BLD AUTO: 0.04 X10(3) UL (ref 0–0.2)
BASOPHILS NFR BLD AUTO: 1.1 %
BILIRUB SERPL-MCNC: 0.7 MG/DL (ref 0.1–2)
BUN BLD-MCNC: 18 MG/DL (ref 9–23)
CALCIUM BLD-MCNC: 9.7 MG/DL (ref 8.5–10.1)
CHLORIDE SERPL-SCNC: 105 MMOL/L (ref 98–112)
CHOLEST SERPL-MCNC: 248 MG/DL (ref ?–200)
CO2 SERPL-SCNC: 30 MMOL/L (ref 21–32)
CREAT BLD-MCNC: 0.65 MG/DL
EGFRCR SERPLBLD CKD-EPI 2021: 96 ML/MIN/1.73M2 (ref 60–?)
EOSINOPHIL # BLD AUTO: 0.11 X10(3) UL (ref 0–0.7)
EOSINOPHIL NFR BLD AUTO: 2.9 %
ERYTHROCYTE [DISTWIDTH] IN BLOOD BY AUTOMATED COUNT: 12.3 %
FASTING PATIENT LIPID ANSWER: YES
FASTING STATUS PATIENT QL REPORTED: YES
GLOBULIN PLAS-MCNC: 3.5 G/DL (ref 2.8–4.4)
GLUCOSE BLD-MCNC: 81 MG/DL (ref 70–99)
HCT VFR BLD AUTO: 41.4 %
HDLC SERPL-MCNC: 106 MG/DL (ref 40–59)
HGB BLD-MCNC: 14 G/DL
IMM GRANULOCYTES # BLD AUTO: 0.01 X10(3) UL (ref 0–1)
IMM GRANULOCYTES NFR BLD: 0.3 %
LDLC SERPL CALC-MCNC: 131 MG/DL (ref ?–100)
LYMPHOCYTES # BLD AUTO: 1.49 X10(3) UL (ref 1–4)
LYMPHOCYTES NFR BLD AUTO: 39.9 %
MCH RBC QN AUTO: 30.5 PG (ref 26–34)
MCHC RBC AUTO-ENTMCNC: 33.8 G/DL (ref 31–37)
MCV RBC AUTO: 90.2 FL
MONOCYTES # BLD AUTO: 0.44 X10(3) UL (ref 0.1–1)
MONOCYTES NFR BLD AUTO: 11.8 %
NEUTROPHILS # BLD AUTO: 1.64 X10 (3) UL (ref 1.5–7.7)
NEUTROPHILS # BLD AUTO: 1.64 X10(3) UL (ref 1.5–7.7)
NEUTROPHILS NFR BLD AUTO: 44 %
NONHDLC SERPL-MCNC: 142 MG/DL (ref ?–130)
OSMOLALITY SERPL CALC.SUM OF ELEC: 291 MOSM/KG (ref 275–295)
PLATELET # BLD AUTO: 212 10(3)UL (ref 150–450)
POTASSIUM SERPL-SCNC: 3.9 MMOL/L (ref 3.5–5.1)
PROT SERPL-MCNC: 7.1 G/DL (ref 6.4–8.2)
RBC # BLD AUTO: 4.59 X10(6)UL
SODIUM SERPL-SCNC: 140 MMOL/L (ref 136–145)
TRIGL SERPL-MCNC: 69 MG/DL (ref 30–149)
VLDLC SERPL CALC-MCNC: 12 MG/DL (ref 0–30)
WBC # BLD AUTO: 3.7 X10(3) UL (ref 4–11)

## 2023-11-21 PROCEDURE — 80061 LIPID PANEL: CPT | Performed by: FAMILY MEDICINE

## 2023-11-21 PROCEDURE — 80053 COMPREHEN METABOLIC PANEL: CPT | Performed by: FAMILY MEDICINE

## 2023-11-21 PROCEDURE — 85025 COMPLETE CBC W/AUTO DIFF WBC: CPT | Performed by: FAMILY MEDICINE

## 2023-11-21 PROCEDURE — 36415 COLL VENOUS BLD VENIPUNCTURE: CPT | Performed by: FAMILY MEDICINE

## 2023-12-08 ENCOUNTER — OFFICE VISIT (OUTPATIENT)
Dept: FAMILY MEDICINE CLINIC | Facility: CLINIC | Age: 68
End: 2023-12-08
Payer: MEDICARE

## 2023-12-08 ENCOUNTER — PATIENT MESSAGE (OUTPATIENT)
Dept: FAMILY MEDICINE CLINIC | Facility: CLINIC | Age: 68
End: 2023-12-08

## 2023-12-08 DIAGNOSIS — J34.89 CHRONIC NASAL DISCHARGE: ICD-10-CM

## 2023-12-08 DIAGNOSIS — M71.21 BAKER CYST, RIGHT: ICD-10-CM

## 2023-12-08 DIAGNOSIS — J45.990 EXERCISE-INDUCED ASTHMA: ICD-10-CM

## 2023-12-08 DIAGNOSIS — Z00.00 ENCOUNTER FOR ANNUAL HEALTH EXAMINATION: Primary | ICD-10-CM

## 2023-12-08 NOTE — TELEPHONE ENCOUNTER
From: Prabha Soto  To: Jovany Corbin  Sent: 12/8/2023 11:05 AM CST  Subject: Health report inacurate    I looked at my health report on [facility] after my wellness visit today. My height was recorded as 62.5\" which makes it appear that I am 2.75\" shorter than last year. That is incorrect since the assistant that took my weight did not even measure my height. (shouldn't she have done that?) If I lost any height, I'm sure that it would not be that much!   Taras Norman

## 2023-12-09 VITALS
HEIGHT: 65.25 IN | TEMPERATURE: 97 F | OXYGEN SATURATION: 97 % | WEIGHT: 126 LBS | HEART RATE: 60 BPM | DIASTOLIC BLOOD PRESSURE: 60 MMHG | RESPIRATION RATE: 16 BRPM | BODY MASS INDEX: 20.74 KG/M2 | SYSTOLIC BLOOD PRESSURE: 116 MMHG

## 2023-12-18 ENCOUNTER — HOSPITAL ENCOUNTER (OUTPATIENT)
Dept: MAMMOGRAPHY | Age: 68
Discharge: HOME | End: 2023-12-18
Attending: FAMILY MEDICINE
Payer: MEDICARE

## 2023-12-18 DIAGNOSIS — Z12.31 BREAST CANCER SCREENING BY MAMMOGRAM: ICD-10-CM

## 2023-12-18 PROCEDURE — 77063 BREAST TOMOSYNTHESIS BI: CPT | Performed by: FAMILY MEDICINE

## 2023-12-18 PROCEDURE — 77067 SCR MAMMO BI INCL CAD: CPT | Performed by: FAMILY MEDICINE

## 2024-01-10 ENCOUNTER — OFFICE VISIT (OUTPATIENT)
Facility: LOCATION | Age: 69
End: 2024-01-10
Payer: MEDICARE

## 2024-01-10 DIAGNOSIS — J30.0 VASOMOTOR RHINITIS: Primary | ICD-10-CM

## 2024-01-10 DIAGNOSIS — J34.89 NASAL VESTIBULITIS: ICD-10-CM

## 2024-01-10 PROCEDURE — 99203 OFFICE O/P NEW LOW 30 MIN: CPT | Performed by: OTOLARYNGOLOGY

## 2024-01-10 RX ORDER — IPRATROPIUM BROMIDE 42 UG/1
1 SPRAY, METERED NASAL 2 TIMES DAILY
Qty: 1 EACH | Refills: 5 | Status: SHIPPED | OUTPATIENT
Start: 2024-01-10

## 2024-01-10 NOTE — PROGRESS NOTES
Klarissa Weber is a 68 year old female.   Chief Complaint   Patient presents with    Nose Problem     HPI:   She has a chronic runny nose.  At times she will get some smell from her nose.  She has tried Claritin without relief.  She is status post septoplasty x 2 in the distant past  Current Outpatient Medications   Medication Sig Dispense Refill    ipratropium 0.06 % Nasal Solution 1 spray by Nasal route 2 (two) times daily. 1 each 5    Calcium-Magnesium-Vitamin D (CALCIUM 1200+D3 OR) Take by mouth.      albuterol 108 (90 Base) MCG/ACT Inhalation Aero Soln Inhale 2 puffs into the lungs every 6 (six) hours as needed for Wheezing. 18 g 2    estradiol (ESTRACE) 0.1 MG/GM Vaginal Cream 0.5 g of cream intravaginally administered daily for 2 weeks, then reduce to twice weekly 42.5 g 2    Multiple Vitamins-Minerals (PRESERVISION AREDS OR) Take by mouth daily.      CRANBERRY OR Take by mouth.      Vitamin D3 2000 units Oral Cap Take 1 capsule (2,000 Units total) by mouth daily.      magnesium 250 MG Oral Tab Take 1 tablet (250 mg total) by mouth.        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 Quiet for 20+ years    Cataracts, both eyes 11/2021    Cervicalgia     Chronic venous stasis dermatitis of both lower extremities 10/14/2016    Dense breasts 11/21/2022    cat c heterogeneously dense    Iritis     Osteopenia 11/21/2022 11/21/22 DEXA osteopenia    Pap smear for cervical cancer screening 09/29/2017    Pap & HPV negative. No abn pap.    Personal history of colonic polyps 01/24/2020    Postmenopausal vaginal bleeding 10/25/2022    10/13/22 PCP annual exam. Vaginal dryness. Rx Estradiol cream 0.5 grams intravaginally 2 times per week, then twice weekly  10/25/22 Cramping & brown discharge. Had not started estradiol 11/16/22 Pelvic US - Mid-position to slightly retroverted uterus 6.8 x 5.0 x 2.7 cm.   Endometrium 7 mm. Ovaries normal. No free fluid.     Primary insomnia 10/04/2018    toruble stayinging.    Sepsis due to urinary tract infection  (HCC) 2009    uro sepsis    Sleep disturbance     insomnia/difficulty staying asleep    Umbilical hernia     Umbilical hernia, incarcerated 10/14/2016    Vaginal dryness 05/16/2019    Vaginal dryness: Has been having pain with intercourse due to vaginal dryness. Has tried lubricants with only mild improvement. She has also noticed small spots of blood on toilet paper after intercourse with this before as well.    Varicose vein of leg     Wears glasses       Social History:  Social History     Socioeconomic History    Marital status:    Tobacco Use    Smoking status: Never    Smokeless tobacco: Never   Vaping Use    Vaping Use: Never used   Substance and Sexual Activity    Alcohol use: Yes     Alcohol/week: 0.0 standard drinks of alcohol     Comment: 2-3 drinks a month    Drug use: No   Other Topics Concern    Caffeine Concern Yes     Comment: 2 tea daily, coffee    Sleep Concern Yes     Comment: wakes during the night    Exercise Yes     Comment: 4 times weekly- runs,walks biking    Seat Belt Yes    Self-Exams No      Past Surgical History:   Procedure Laterality Date    COLONOSCOPY & POLYPECTOMY  01/22/2015    hyperplastic polyp, single non bleeding 6 mm AVM of cecum.    CYSTOURETHROSCOPY  03/06/2022    Dr. Dick Camp - for gross hematuria & recurrent UTI. Cystoscopy today unrevealing for concerning pathology.  Small submucosal nodule at dome does not appear suspicious for malignancy. 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    HERNIA SURGERY  06/15/2017    umbilical hernia repair    IR VARICOSE VEIN ENDOVENOUS LASER ABLATION(CPT=36478) Left 2017    left greater saphenous vein endovenous radiofrequency ablation for venous incompetence and symptomatic varicose veins    OTHER SURGICAL HISTORY      ventricular septal defect repair     OTHER SURGICAL HISTORY  03/16/2022    Cystoscopy-     TONSILLECTOMY  1972         REVIEW OF SYSTEMS:   GENERAL HEALTH: feels well otherwise  GENERAL : denies fever, chills, sweats, weight loss, weight gain  SKIN: denies any unusual skin lesions or rashes  RESPIRATORY: denies shortness of breath with exertion  NEURO: denies headaches    EXAM:   There were no vitals taken for this visit.    System Findings Details   Constitutional  Overall appearance - Normal.   Psychiatric  Orientation - Oriented to time, place, person & situation. Appropriate mood and affect.   Head/Face  Facial features -- Normal. Skull - Normal.   Eyes  Pupils equal ,round ,react to light and accomidate   Ears, Nose, Throat, Neck  Ears clear nose mild nasal vestibulitis oropharynx clear neck no masses   Neurological  Memory - Normal. Cranial nerves - Cranial nerves II through XII grossly intact.   Lymph Detail  Submental. Submandibular. Anterior cervical. Posterior cervical. Supraclavicular.       ASSESSMENT AND PLAN:   1. Vasomotor rhinitis  She will try Atrovent nasal spray for her chronic runny nose.    2. Nasal vestibulitis  If she gets crusting in her nose she may add some bacitracin ointment.      The patient indicates understanding of these issues and agrees to the plan.    No follow-ups on file.    Darron Shaver MD  1/10/2024  1:10 PM

## 2024-01-19 ENCOUNTER — OFFICE VISIT (OUTPATIENT)
Dept: FAMILY MEDICINE CLINIC | Facility: CLINIC | Age: 69
End: 2024-01-19
Payer: MEDICARE

## 2024-01-19 VITALS
OXYGEN SATURATION: 97 % | DIASTOLIC BLOOD PRESSURE: 70 MMHG | BODY MASS INDEX: 20.74 KG/M2 | HEART RATE: 61 BPM | HEIGHT: 65.25 IN | SYSTOLIC BLOOD PRESSURE: 110 MMHG | WEIGHT: 126 LBS

## 2024-01-19 DIAGNOSIS — R30.0 DYSURIA: Primary | ICD-10-CM

## 2024-01-19 DIAGNOSIS — R31.9 HEMATURIA, UNSPECIFIED TYPE: ICD-10-CM

## 2024-01-19 LAB
APPEARANCE: CLEAR
BILIRUB UR QL STRIP.AUTO: NEGATIVE
BILIRUBIN: NEGATIVE
CLARITY UR REFRACT.AUTO: CLEAR
GLUCOSE (URINE DIPSTICK): NEGATIVE MG/DL
GLUCOSE UR STRIP.AUTO-MCNC: NORMAL MG/DL
KETONES (URINE DIPSTICK): NEGATIVE MG/DL
KETONES UR STRIP.AUTO-MCNC: NEGATIVE MG/DL
LEUKOCYTE ESTERASE UR QL STRIP.AUTO: 500
MULTISTIX LOT#: ABNORMAL NUMERIC
NITRITE UR QL STRIP.AUTO: NEGATIVE
NITRITE, URINE: NEGATIVE
PH UR STRIP.AUTO: 6.5 [PH] (ref 5–8)
PH, URINE: 6.5 (ref 4.5–8)
PROT UR STRIP.AUTO-MCNC: NEGATIVE MG/DL
PROTEIN (URINE DIPSTICK): NEGATIVE MG/DL
RBC #/AREA URNS AUTO: >10 /HPF
SP GR UR STRIP.AUTO: 1.01 (ref 1–1.03)
SPECIFIC GRAVITY: 1.01 (ref 1–1.03)
URINE-COLOR: YELLOW
UROBILINOGEN UR STRIP.AUTO-MCNC: NORMAL MG/DL
UROBILINOGEN,SEMI-QN: 0.2 MG/DL (ref 0–1.9)
YEAST UR QL: PRESENT /HPF

## 2024-01-19 PROCEDURE — 99213 OFFICE O/P EST LOW 20 MIN: CPT | Performed by: STUDENT IN AN ORGANIZED HEALTH CARE EDUCATION/TRAINING PROGRAM

## 2024-01-19 PROCEDURE — 81003 URINALYSIS AUTO W/O SCOPE: CPT | Performed by: STUDENT IN AN ORGANIZED HEALTH CARE EDUCATION/TRAINING PROGRAM

## 2024-01-19 PROCEDURE — 1125F AMNT PAIN NOTED PAIN PRSNT: CPT | Performed by: STUDENT IN AN ORGANIZED HEALTH CARE EDUCATION/TRAINING PROGRAM

## 2024-01-19 PROCEDURE — 81001 URINALYSIS AUTO W/SCOPE: CPT | Performed by: STUDENT IN AN ORGANIZED HEALTH CARE EDUCATION/TRAINING PROGRAM

## 2024-01-19 PROCEDURE — 87086 URINE CULTURE/COLONY COUNT: CPT | Performed by: STUDENT IN AN ORGANIZED HEALTH CARE EDUCATION/TRAINING PROGRAM

## 2024-01-19 RX ORDER — CEPHALEXIN 500 MG/1
500 CAPSULE ORAL 4 TIMES DAILY
Qty: 28 CAPSULE | Refills: 0 | Status: SHIPPED | OUTPATIENT
Start: 2024-01-19 | End: 2024-01-26

## 2024-01-19 NOTE — PROGRESS NOTES
[facility] - [facility]    Chief Complaint   Patient presents with    UTI     Burning when urinating and dark red urine        HPI:   Klarissa Weber is 68 year old patient presenting for the following:    Dysuria  Pain urinating started 2 days ago. Burning seemed intermittent  Pain is: low abdominal cramping    Symptoms  Urgency: no  Frequency: no  Blood in urine: yes  Pain in back:no  Fever: no  Vaginal discharge: no  Mouth Ulcers: no    Medications tried: none  Any antibiotics in the last 30 days: no  More than 3 UTIs in the last 12 months: no  STD exposure: no  LMP: postmenopause    She reports she has had cystoscopy in the past for recurrent UTI and hematuria and it was negative but that was a few years ago.       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 Quiet for 20+ years    Cataracts, both eyes 11/2021    Cervicalgia     Chronic venous stasis dermatitis of both lower extremities 10/14/2016    Dense breasts 11/21/2022    cat c heterogeneously dense    Iritis     Osteopenia 11/21/2022 11/21/22 DEXA osteopenia    Pap smear for cervical cancer screening 09/29/2017    Pap & HPV negative. No abn pap.    Personal history of colonic polyps 01/24/2020    Postmenopausal vaginal bleeding 10/25/2022    10/13/22 PCP annual exam. Vaginal dryness. Rx Estradiol cream 0.5 grams intravaginally 2 times per week, then twice weekly  10/25/22 Cramping & brown discharge. Had not started estradiol 11/16/22 Pelvic US - Mid-position to slightly retroverted uterus 6.8 x 5.0 x 2.7 cm.  Endometrium 7 mm. Ovaries normal. No free fluid.     Primary insomnia 10/04/2018    toruble stayinging.    Sepsis due to urinary  tract infection  (HCC) 2009    uro sepsis    Sleep disturbance     insomnia/difficulty staying asleep    Umbilical hernia     Umbilical hernia, incarcerated 10/14/2016    Vaginal dryness 05/16/2019    Vaginal dryness: Has been having pain with intercourse due to vaginal dryness. Has tried lubricants with only mild improvement. She has also noticed small spots of blood on toilet paper after intercourse with this before as well.    Varicose vein of leg     Wears glasses           SH: reviewed     FH: reviewed        ROS: full 10 point review of systems done and otherwise negative.         PE:  Vital Signs    01/19/24 1129   BP: 110/70   Pulse: 61   PainSc: 3 - (Mild)     Wt Readings from Last 3 Encounters:   01/19/24 126 lb (57.2 kg)   12/08/23 126 lb (57.2 kg)   12/06/22 124 lb (56.2 kg)     Body mass index is 20.81 kg/m².     Physical Exam:  GEN: Well-appearing, NAD, nontoxic  CARD: Regular rate  PULM: comfortable WOB  ABD: soft, nt, nd  EXT: No gross deformity  NEURO: no gross deficit  PSYCH: normal affect, thought process linear     No visits with results within 4 Week(s) from this visit.   Latest known visit with results is:   Telephone on 09/29/2023   Component Date Value Ref Range Status    Glucose 11/21/2023 81  70 - 99 mg/dL Final    Sodium 11/21/2023 140  136 - 145 mmol/L Final    Potassium 11/21/2023 3.9  3.5 - 5.1 mmol/L Final    Chloride 11/21/2023 105  98 - 112 mmol/L Final    CO2 11/21/2023 30.0  21.0 - 32.0 mmol/L Final    Anion Gap 11/21/2023 5  0 - 18 mmol/L Final    BUN 11/21/2023 18  9 - 23 mg/dL Final    Creatinine 11/21/2023 0.65  0.55 - 1.02 mg/dL Final    Calcium, Total 11/21/2023 9.7  8.5 - 10.1 mg/dL Final    Calculated Osmolality 11/21/2023 291  275 - 295 mOsm/kg Final    eGFR-Cr 11/21/2023 96  >=60 mL/min/1.73m2 Final    AST 11/21/2023 17  15 - 37 U/L Final    ALT 11/21/2023 18  13 - 56 U/L Final    Alkaline Phosphatase 11/21/2023 76  55 - 142 U/L Final    Bilirubin, Total 11/21/2023 0.7   0.1 - 2.0 mg/dL Final    Total Protein 2023 7.1  6.4 - 8.2 g/dL Final    Albumin 2023 3.6  3.4 - 5.0 g/dL Final    Globulin  2023 3.5  2.8 - 4.4 g/dL Final    A/G Ratio 2023 1.0  1.0 - 2.0 Final    Patient Fasting for CMP? 2023 Yes   Final    Cholesterol, Total 2023 248 (H)  <200 mg/dL Final    HDL Cholesterol 2023 106 (H)  40 - 59 mg/dL Final    Triglycerides 2023 69  30 - 149 mg/dL Final    LDL Cholesterol 2023 131 (H)  <100 mg/dL Final    VLDL 2023 12  0 - 30 mg/dL Final    Non HDL Chol 2023 142 (H)  <130 mg/dL Final    Patient Fasting for Lipid? 2023 Yes   Final    WBC 2023 3.7 (L)  4.0 - 11.0 x10(3) uL Final    RBC 2023 4.59  3.80 - 5.30 x10(6)uL Final    HGB 2023 14.0  12.0 - 16.0 g/dL Final    HCT 2023 41.4  35.0 - 48.0 % Final    PLT 2023 212.0  150.0 - 450.0 10(3)uL Final    MCV 2023 90.2  80.0 - 100.0 fL Final    MCH 2023 30.5  26.0 - 34.0 pg Final    MCHC 2023 33.8  31.0 - 37.0 g/dL Final    RDW 2023 12.3  % Final    Neutrophil Absolute Prelim 2023 1.64  1.50 - 7.70 x10 (3) uL Final    Neutrophil Absolute 2023 1.64  1.50 - 7.70 x10(3) uL Final    Lymphocyte Absolute 2023 1.49  1.00 - 4.00 x10(3) uL Final    Monocyte Absolute 2023 0.44  0.10 - 1.00 x10(3) uL Final    Eosinophil Absolute 2023 0.11  0.00 - 0.70 x10(3) uL Final    Basophil Absolute 2023 0.04  0.00 - 0.20 x10(3) uL Final    Immature Granulocyte Absolute 2023 0.01  0.00 - 1.00 x10(3) uL Final    Neutrophil % 2023 44.0  % Final    Lymphocyte % 2023 39.9  % Final    Monocyte % 2023 11.8  % Final    Eosinophil % 2023 2.9  % Final    Basophil % 2023 1.1  % Final    Immature Granulocyte % 2023 0.3  % Final        A/P: Klarissa Weber is 68 year old presenting for the followin. Dysuria. UA with leukocytes and blood. Will send for urine  microscopy and culture. Treat empirically with keflex. Pt to follow up in 4-6 weeks for another UA, will be ordered at the lab for completion. If hematuria is persistent will plan to send to urology for cystoscopy.        Outpatient Encounter Medications as of 1/19/2024   Medication Sig Dispense Refill    ipratropium 0.06 % Nasal Solution 1 spray by Nasal route 2 (two) times daily. 1 each 5    Calcium-Magnesium-Vitamin D (CALCIUM 1200+D3 OR) Take by mouth.      albuterol 108 (90 Base) MCG/ACT Inhalation Aero Soln Inhale 2 puffs into the lungs every 6 (six) hours as needed for Wheezing. 18 g 2    estradiol (ESTRACE) 0.1 MG/GM Vaginal Cream 0.5 g of cream intravaginally administered daily for 2 weeks, then reduce to twice weekly 42.5 g 2    Multiple Vitamins-Minerals (PRESERVISION AREDS OR) Take by mouth daily.      CRANBERRY OR Take by mouth.      Vitamin D3 2000 units Oral Cap Take 1 capsule (2,000 Units total) by mouth daily.      magnesium 250 MG Oral Tab Take 1 tablet (250 mg total) by mouth.       Facility-Administered Encounter Medications as of 1/19/2024   Medication Dose Route Frequency Provider Last Rate Last Admin    Albuterol Sulfate (VENTOLIN) (2.5 MG/3ML) 0.083% nebulizer solution 2.5 mg  2.5 mg Nebulization Once Marleny Dawn PA-C               Side effects, risks and benefits of medications were explained.  The patient or responsible adult showed the ability to learn, asked appropriate questions.  There were no barriers to learning and they verbalized understanding of the treatment plan.     Medication list provided to patient and /or family member.        Fior Beckman MD

## 2024-01-22 DIAGNOSIS — R31.1 BENIGN ESSENTIAL MICROSCOPIC HEMATURIA: Primary | ICD-10-CM

## 2024-02-01 ENCOUNTER — HOSPITAL ENCOUNTER (OUTPATIENT)
Dept: CT IMAGING | Facility: HOSPITAL | Age: 69
Discharge: HOME | End: 2024-02-01
Attending: STUDENT IN AN ORGANIZED HEALTH CARE EDUCATION/TRAINING PROGRAM
Payer: MEDICARE

## 2024-02-01 DIAGNOSIS — R31.1 BENIGN ESSENTIAL MICROSCOPIC HEMATURIA: ICD-10-CM

## 2024-02-01 LAB
CREAT BLD-MCNC: 0.8 MG/DL
EGFRCR SERPLBLD CKD-EPI 2021: 81 ML/MIN/1.73M2 (ref 60–?)

## 2024-02-01 PROCEDURE — 74178 CT ABD&PLV WO CNTR FLWD CNTR: CPT | Performed by: STUDENT IN AN ORGANIZED HEALTH CARE EDUCATION/TRAINING PROGRAM

## 2024-02-01 PROCEDURE — 76377 3D RENDER W/INTRP POSTPROCES: CPT | Performed by: STUDENT IN AN ORGANIZED HEALTH CARE EDUCATION/TRAINING PROGRAM

## 2024-02-01 PROCEDURE — 82565 ASSAY OF CREATININE: CPT

## 2024-02-06 NOTE — PROGRESS NOTES
Urology Clinic Note - New Patient    Referring Provider:  Fior Beckman MD  [id number] [place] [address]     Primary Care Provider:  Nehemiah Irene MD     Chief Complaint:     Hematuria, UTI, pelvic pain     HPI:   Klarissa Weber is a 68 year old female with history of anemia,  referred for gross hematuria and pelvic pain .    Patient with history of recurrent UTI, hematuria. Has previously followed with Dr. Camp. CTU at that time with 7mm AML. Cystoscopy with small nodules at bladder dome; c/w benign entity. This was done on 3/16/22.     More recently she again had a UTI with hematuria 1/19/24. Urine culture was ultimately negative. Was given keflex.   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 Did have some pelvic congestion syndrome.   Has long history of Kassandra. Is on estrace cream.   Today, patient reports hematuria has subsided but she has had pelvic pain for several years. Has pelvic pressure with absence of UTI. Has pain with intercourse. No urinary symptoms. Did have some hematuria between the 2022 and most recent episode.   She also has a history of varicose veins; saw vascular at [facility] and had procedure a few years ago.   No systemic symptoms today. No pertinent family history. No smoking history.       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 Quiet for 20+ years    Cataracts, both eyes 11/2021    Cervicalgia     Chronic venous stasis dermatitis of both lower extremities 10/14/2016    Dense breasts 11/21/2022    cat c heterogeneously dense    Iritis     Osteopenia 11/21/2022 11/21/22 DEXA osteopenia    Pap smear for cervical cancer screening 09/29/2017    Pap & HPV negative. No abn pap.    Personal history of colonic polyps 01/24/2020    Postmenopausal vaginal bleeding 10/25/2022    10/13/22 PCP annual exam. Vaginal dryness. Rx Estradiol cream 0.5 grams intravaginally 2  times per week, then twice weekly  10/25/22 Cramping & brown discharge. Had not started estradiol 11/16/22 Pelvic US - Mid-position to slightly retroverted uterus 6.8 x 5.0 x 2.7 cm.  Endometrium 7 mm. Ovaries normal. No free fluid.     Primary insomnia 10/04/2018    toruble stayinging.    Sepsis due to urinary tract infection  (HCC) 2009    uro sepsis    Sleep disturbance     insomnia/difficulty staying asleep    Umbilical hernia     Umbilical hernia, incarcerated 10/14/2016    Vaginal dryness 05/16/2019    Vaginal dryness: Has been having pain with intercourse due to vaginal dryness. Has tried lubricants with only mild improvement. She has also noticed small spots of blood on toilet paper after intercourse with this before as well.    Varicose vein of leg     Wears glasses        Past Surgical History:   Procedure Laterality Date    COLONOSCOPY & POLYPECTOMY  01/22/2015    hyperplastic polyp, single non bleeding 6 mm AVM of cecum.    CYSTOURETHROSCOPY  03/06/2022    Dr. Dick Camp - for gross hematuria & recurrent UTI. Cystoscopy today unrevealing for concerning pathology.  Small submucosal nodule at dome does not appear suspicious for malignancy. 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    HERNIA SURGERY  06/15/2017    umbilical hernia repair    IR VARICOSE VEIN ENDOVENOUS LASER ABLATION(CPT=36478) Left 2017    left greater saphenous vein endovenous radiofrequency ablation for venous incompetence and symptomatic varicose veins    OTHER SURGICAL HISTORY      ventricular septal defect repair    OTHER SURGICAL HISTORY  03/16/2022    Cystoscopy-     TONSILLECTOMY  1972       Family History   Problem Relation Age of Onset    Dementia Father     Stroke Father         syndrome    Hypertension Mother     Stroke Mother     Other (parvez danlos syndrome) Daughter 34    Other (sleep apnea) Son     No Known Problems Maternal Grandmother     No Known Problems Maternal Grandfather     No  Known Problems Paternal Grandmother     No Known Problems Paternal Grandfather     Prostate Cancer Brother         dx age 48    Breast Cancer Paternal Aunt         70'S    Ovarian Cancer Neg     Uterine Cancer Neg     Pancreatic Cancer Neg     Colon Cancer Neg        Social History     Socioeconomic History    Marital status:    Tobacco Use    Smoking status: Never    Smokeless tobacco: Never   Vaping Use    Vaping Use: Never used   Substance and Sexual Activity    Alcohol use: Yes     Alcohol/week: 0.0 standard drinks of alcohol     Comment: 2-3 drinks a month    Drug use: No   Other Topics Concern    Caffeine Concern Yes     Comment: 2 tea daily, coffee    Sleep Concern Yes     Comment: wakes during the night    Exercise Yes     Comment: 4 times weekly- runs,walks biking    Seat Belt Yes    Self-Exams No       Medications (Active prior to today's visit):  Current Outpatient Medications   Medication Sig Dispense Refill    ipratropium 0.06 % Nasal Solution 1 spray by Nasal route 2 (two) times daily. 1 each 5    Calcium-Magnesium-Vitamin D (CALCIUM 1200+D3 OR) Take by mouth.      albuterol 108 (90 Base) MCG/ACT Inhalation Aero Soln Inhale 2 puffs into the lungs every 6 (six) hours as needed for Wheezing. 18 g 2    estradiol (ESTRACE) 0.1 MG/GM Vaginal Cream 0.5 g of cream intravaginally administered daily for 2 weeks, then reduce to twice weekly 42.5 g 2    Multiple Vitamins-Minerals (PRESERVISION AREDS OR) Take by mouth daily.      CRANBERRY OR Take by mouth.      Vitamin D3 2000 units Oral Cap Take 1 capsule (2,000 Units total) by mouth daily.      magnesium 250 MG Oral Tab Take 1 tablet (250 mg total) by mouth.         Allergies:  Allergies   Allergen Reactions    Lamisil [Terbinafine] RASH and ITCHING         Review of Systems:   A comprehensive 10-point review of systems was completed.  Pertinent positives and negatives are noted in the the HPI.    Physical Exam:   CONSTITUTIONAL: Well developed, well  nourished, in no acute distress  NEUROLOGIC: Alert and oriented  HEAD: Normocephalic, atraumatic  EYES: Sclera non-icteric  ENT: Hearing intact, moist mucous membranes  NECK: No obvious goiter or masses  RESPIRATORY: Normal respiratory effort  SKIN: No evident rashes     Assessment & Plan:   Klarissa Weber is a 68 year old female with history of anemia,  referred for gross hematuria and pelvic pain .    # Hematuria; given almost 2 years since last workup and ?rowanin, recommended cystoscopy; she would like to proceed. Will schedule  Discussed potential etiologies, may be related to pelvic congestion syndrome (see below).     # Pelvic pain/pelvic congestion syndrome; reviewed imaging with patient; she does have classic symptoms of PCS with pelvic pain, pain with intercourse, hematuria and history of varicose veins; will refer to vascular surgery (she will call her previous vascular surgeon).   Discussed PFPT, she would like to trial this as well.   Continue estrace cream.       Thank you for this consult.    I have personally reviewed all relevant medical records, labs, and imaging.       Baldemar Melara MD  Staff Urologist  [facility]  Office: [phone]

## 2024-02-07 ENCOUNTER — OFFICE VISIT (OUTPATIENT)
Dept: SURGERY | Facility: CLINIC | Age: 69
End: 2024-02-07
Payer: MEDICARE

## 2024-02-07 ENCOUNTER — PATIENT MESSAGE (OUTPATIENT)
Dept: SURGERY | Facility: CLINIC | Age: 69
End: 2024-02-07

## 2024-02-07 DIAGNOSIS — N94.89 PELVIC CONGESTION: Primary | ICD-10-CM

## 2024-02-07 DIAGNOSIS — R10.2 PELVIC PRESSURE IN FEMALE: ICD-10-CM

## 2024-02-07 DIAGNOSIS — R82.90 URINE FINDING: Primary | ICD-10-CM

## 2024-02-07 DIAGNOSIS — N94.89 PELVIC CONGESTION SYNDROME: ICD-10-CM

## 2024-02-07 LAB
APPEARANCE: CLEAR
BILIRUBIN: NEGATIVE
GLUCOSE (URINE DIPSTICK): NEGATIVE MG/DL
KETONES (URINE DIPSTICK): NEGATIVE MG/DL
MULTISTIX LOT#: ABNORMAL NUMERIC
NITRITE, URINE: NEGATIVE
PH, URINE: 5.5 (ref 4.5–8)
PROTEIN (URINE DIPSTICK): NEGATIVE MG/DL
SPECIFIC GRAVITY: <=1.005 (ref 1–1.03)
URINE-COLOR: YELLOW
UROBILINOGEN,SEMI-QN: 0.2 MG/DL (ref 0–1.9)

## 2024-02-07 PROCEDURE — 99204 OFFICE O/P NEW MOD 45 MIN: CPT | Performed by: UROLOGY

## 2024-02-07 PROCEDURE — 81002 URINALYSIS NONAUTO W/O SCOPE: CPT | Performed by: UROLOGY

## 2024-02-09 NOTE — TELEPHONE ENCOUNTER
From: Klarissa Weber  To: Baldemar Melara  Sent: 2/7/2024 4:07 PM CST  Subject: referral to vascular surgeon    Vincenzo, I saw Dr. Melara for a consultation this morning and he recommended that I see a vascular surgeon concerning possible pelvic congestion syndrome. I mentioned that I had seen Dr. Castro of [facility] in the past for a vascular leg issue and he recommended that I contact Dr. DAWN's office. When I spoke to someone there, I was told that I needed a referral and they suggested that Dr. DAWN may not be the one to treat this condition. My request is: a referral to Dr. DAWN with specifics about the condition or a referral to a different vascular surgeon. I'm happy to see someone in the [facility] system if that's the case.   Thank you,  Klarissa Weber

## 2024-03-10 NOTE — PROGRESS NOTES
Clinic Procedure Note    INDICATIONS:     Klarissa Weber is a 68 year old female with history of anemia,  referred for gross hematuria and pelvic pain .     Patient with history of recurrent UTI, hematuria. Has previously followed with Dr. Camp. CTU at that time with 7mm AML. Cystoscopy with small nodules at bladder dome; c/w benign entity. This was done on 3/16/22.      More recently she again had a UTI with hematuria 24. Urine culture was ultimately negative. Was given keflex.   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 Did have some pelvic congestion syndrome.   Has long history of Kassandra. Is on estrace cream.     She established with me on 14.   Patient reports hematuria has subsided but she has had pelvic pain for several years. Has pelvic pressure with absence of UTI. Has pain with intercourse. No urinary symptoms. Did have some hematuria between the  and most recent episode.   She also has a history of varicose veins; saw vascular at [facility] and had procedure a few years ago.   No systemic symptoms today. No pertinent family history. No smoking history.     For her hematuria; cystoscopy was ordered (CTU as above).   For her pelvic pain and ?pelvic congestion; we discissed seeing vascular surgery for this.   Seeing Dr. Colvin 3/18/24.     Doing well today. No changes to her voiding symptoms. No ongoing hematuria.     PROCEDURE:       1. Flexible cystourethroscopy    DATE OF PROCEDURE: 3/10/2024     PRE-PROCEDURE DIAGNOSIS: Gross hematuria     POST-PROCEDURE DIAGNOSIS: Same     SURGEON: Baldemar Melara MD    FINDINGS:    Urethra: Normal caliber urethra throughout without lesions    Bladder: Normal mucosa with no papillary lesions or erythema, no trabeculation; 1 cm submucosal cyst/nodule at left dome of bladder;     Ureteral orifices: Orthotopic    Other findings: None    PROCEDURE:   Patient was brought to the procedure suite and a time-out was performed identifiying the patient,  and procedure to be  performed. The risks and benefits of the procedure were once again discussed with the patient including bleeding, infection, and dysuria. The patient agreed to proceed. The patient did not have any signs or symptoms of active UTI.    She was placed in supine position on the table with legs to the sides and the genital area was prepped and draped in the standard sterile fashion. A flexible cystoscope was inserted per urethra. There were no urethral strictures present. The bladder was fully inspected (including retroflexion) and showed findings as above. Both ureteral orifices were orthotopic. A bladder barbotage was obtained. The scope was then carefully removed and once again no urethral abnormalities were noted.    There were no complications and the patient tolerated the procedure well.    IMPRESSION:  Klarissa Weber is a 68 year old female with history of anemia,  referred for gross hematuria and pelvic pain .    PLAN:   -Offered cystoscopy, bladder biopsy to definitive diagnosis given recurrent hematuria but she would like to defer for now. I do agree with previous cystoscopic findings with Dr. Camp that this is likely benign. We had extensive discussion regarding this. For now,  she will follow up in 3mo, sooner if any issues.    Will fu cytology.   She is seeing vascular for her pelvic congestion syndrome. I discussed that she may also discuss with gynecology. Given mild nature I told her this will likely be observed.   Continue vaginal estrogen for UTI history.       AML- discussed findings in detail; stable over 2 years; continue observation (very small sub centimeter)        Baldemar Melara MD  Staff Urologist  [facility]  Office: [phone]

## 2024-03-11 ENCOUNTER — PROCEDURE (OUTPATIENT)
Dept: SURGERY | Facility: CLINIC | Age: 69
End: 2024-03-11
Payer: MEDICARE

## 2024-03-11 VITALS — DIASTOLIC BLOOD PRESSURE: 68 MMHG | SYSTOLIC BLOOD PRESSURE: 104 MMHG

## 2024-03-11 DIAGNOSIS — R82.90 URINE FINDING: Primary | ICD-10-CM

## 2024-03-11 LAB
APPEARANCE: CLEAR
BILIRUBIN: NEGATIVE
GLUCOSE (URINE DIPSTICK): NEGATIVE MG/DL
KETONES (URINE DIPSTICK): NEGATIVE MG/DL
LEUKOCYTES: NEGATIVE
MULTISTIX LOT#: ABNORMAL NUMERIC
NITRITE, URINE: NEGATIVE
PH, URINE: 6.5 (ref 4.5–8)
PROTEIN (URINE DIPSTICK): NEGATIVE MG/DL
SPECIFIC GRAVITY: 1.01 (ref 1–1.03)
URINE-COLOR: YELLOW
UROBILINOGEN,SEMI-QN: 0.2 MG/DL (ref 0–1.9)

## 2024-03-11 PROCEDURE — 81003 URINALYSIS AUTO W/O SCOPE: CPT | Performed by: UROLOGY

## 2024-03-11 PROCEDURE — 52000 CYSTOURETHROSCOPY: CPT | Performed by: UROLOGY

## 2024-03-11 RX ORDER — CIPROFLOXACIN 500 MG/1
500 TABLET, FILM COATED ORAL ONCE
Status: SHIPPED | OUTPATIENT
Start: 2024-03-11

## 2024-03-12 LAB — NON GYNE INTERPRETATION: NEGATIVE

## 2024-05-07 ENCOUNTER — OFFICE VISIT (OUTPATIENT)
Dept: FAMILY MEDICINE CLINIC | Facility: CLINIC | Age: 69
End: 2024-05-07
Payer: MEDICARE

## 2024-05-07 VITALS
BODY MASS INDEX: 22.67 KG/M2 | DIASTOLIC BLOOD PRESSURE: 72 MMHG | SYSTOLIC BLOOD PRESSURE: 98 MMHG | HEART RATE: 78 BPM | HEIGHT: 62.5 IN | RESPIRATION RATE: 16 BRPM | WEIGHT: 126.38 LBS

## 2024-05-07 DIAGNOSIS — R42 POSITIONAL LIGHTHEADEDNESS: ICD-10-CM

## 2024-05-07 DIAGNOSIS — M25.562 ACUTE PAIN OF LEFT KNEE: Primary | ICD-10-CM

## 2024-05-07 PROCEDURE — 99214 OFFICE O/P EST MOD 30 MIN: CPT | Performed by: NURSE PRACTITIONER

## 2024-05-07 RX ORDER — NAPROXEN 500 MG/1
500 TABLET ORAL 2 TIMES DAILY WITH MEALS
Qty: 10 TABLET | Refills: 0 | Status: SHIPPED | OUTPATIENT
Start: 2024-05-07

## 2024-05-07 NOTE — PROGRESS NOTES
Chief Complaint   Patient presents with    Knee Pain     Was off and on, ran a 5K recently and after that pain came back, left knee        HPI:  Presents with approx 3-4 week history of worsening of left knee pain she has had for several months. Reports did recent 5k run and since then pain has been worse to medial aspect of knee area. Reports pain is worse with walking and similar activities. Reports knee will occasionally throb while at rest. Denies redness, swelling, locking, buckling or limited ROM to knee. Has been treating with rest and some ibuprofen with temporary improvement of symptoms.     Also, reports approx 2-3 year history of feeling transient lightheadedness that occurs with bending forward and then getting up or also with going from sitting to standing quickly. Reports feels lightheaded and vision fades similar to a \"black out\" but she does not actually fall or have syncope. Reports lasts a few seconds and then will resolve. At times has lingering headache after this. Reports some days it happens frequently and other days hardly at all. Does not feel it has been particularly worse lately. Denies chest pain, palpitations, SOB, VELAZQUEZ. Denies N/T/weakness to any body part, facial droop, slurred speech. Has not treated with anything.     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 No abn pap.    Personal history of colonic polyps    Postmenopausal vaginal bleeding    10/13/22 PCP annual exam. Vaginal dryness. Rx Estradiol cream 0.5 grams intravaginally 2 times per week, then twice weekly  10/25/22 Cramping & brown discharge. Had not started  estradiol 11/16/22 Pelvic US - Mid-position to slightly retroverted uterus 6.8 x 5.0 x 2.7 cm.  Endometrium 7 mm. Ovaries normal. No free fluid.     Primary insomnia    toruble stayinging.    Sepsis due to urinary tract infection (HCC)    uro sepsis    Sleep disturbance    insomnia/difficulty staying asleep    Umbilical hernia    Umbilical hernia, incarcerated    Vaginal dryness    Vaginal dryness: Has been having pain with intercourse due to vaginal dryness. Has tried lubricants with only mild improvement. She has also noticed small spots of blood on toilet paper after intercourse with this before as well.    Varicose vein of leg    Wears glasses       Patient Active Problem List   Diagnosis    Chronic venous stasis dermatitis of both lower extremities    Primary insomnia    Personal history of colonic polyps    Postmenopausal vaginal bleeding       Current Outpatient Medications   Medication Sig Dispense Refill    naproxen 500 MG Oral Tab Take 1 tablet (500 mg total) by mouth 2 (two) times daily with meals. 10 tablet 0    ipratropium 0.06 % Nasal Solution 1 spray by Nasal route 2 (two) times daily. 1 each 5    Calcium-Magnesium-Vitamin D (CALCIUM 1200+D3 OR) Take by mouth.      albuterol 108 (90 Base) MCG/ACT Inhalation Aero Soln Inhale 2 puffs into the lungs every 6 (six) hours as needed for Wheezing. 18 g 2    estradiol (ESTRACE) 0.1 MG/GM Vaginal Cream 0.5 g of cream intravaginally administered daily for 2 weeks, then reduce to twice weekly 42.5 g 2    Multiple Vitamins-Minerals (PRESERVISION AREDS OR) Take by mouth daily.      CRANBERRY OR Take by mouth.      Vitamin D3 2000 units Oral Cap Take 1 capsule (2,000 Units total) by mouth daily.      magnesium 250 MG Oral Tab Take 1 tablet (250 mg total) by mouth.         Physical Exam  BP 98/72   Pulse 78   Resp 16   Ht 5' 2.5\" (1.588 m)   Wt 126 lb 6.4 oz (57.3 kg)   BMI 22.75 kg/m²   Constitutional: well developed, well nourished, in no apparent  distress  HEENT: Normocephalic and atraumatic.   Eyes: Conjunctivae are pink and moist without exudate or drainage. EOMI. PERRLA.  Neuro: A/Ox3. Cranial nerves II-XII intact. Follows commands appropriately. Speech fluid.  Neck: Normal range of motion.  Cardiovascular: Normal rate, regular rhythm.  No murmur.   Pulmonary/Chest: No respiratory distress. Effort normal. Breath sounds clear bilaterally. No wheezes, rhonchi or rales.  Ext:  right knee w/o edema, erythema, masses, deformity, TTP, limited ROM, clicking or popping. No laxity to knee.   Skin: Skin is warm and dry. No rash noted. No erythema. No pallor.     A/P:    Encounter Diagnoses   Name Primary?    Acute pain of left knee- naproxen. Physical therapy. Wear OTC knee brace with activity. If no improvement with these interventions, see Dr. Perez as referred. Verbalized understanding of instructions and agreeable to this plan of care.    Yes    Positional lightheadedness- check labs and holter testing as ordered. Discussed remaining well hydrated and changing positions slowly. If symptoms worsen notify office, present to ER if severe or actual syncope. Verbalized understanding of instructions and agreeable to this plan of care.           Orders Placed This Encounter   Procedures    CBC With Differential With Platelet    Comp Metabolic Panel (14) [E]       Meds & Refills for this Visit:  Requested Prescriptions     Signed Prescriptions Disp Refills    naproxen 500 MG Oral Tab 10 tablet 0     Sig: Take 1 tablet (500 mg total) by mouth 2 (two) times daily with meals.       Imaging & Consults:  PHYSICAL THERAPY EXTERNAL  ORTHOPEDIC - INTERNAL  CARD MONITOR HOLTER 48 HOUR (CPT=93225)    No follow-ups on file.  Patient Instructions                             Take Naproxen 1 tab, twice daily, until all tabs are gone. Space doses 12 hours apart for best effect. TAKE WITH FOOD. Do not take any Advil, Motrin, Aleve or ibuprofen products while taking this medication.          It is ok to take acetaminophen (Tylenol) while taking this medication. If you have regular strength tylenol may take 3 tabs up to 3 times daily. If you have extra-strength tylenol may take 2 tabs up to 3 times daily.       Go to physical therapy as referred.     If no improvement with physical therapy see Dr. Perez.       All questions were answered and the patient understands the plan.

## 2024-05-07 NOTE — PATIENT INSTRUCTIONS
Take Naproxen 1 tab, twice daily, until all tabs are gone. Space doses 12 hours apart for best effect. TAKE WITH FOOD. Do not take any Advil, Motrin, Aleve or ibuprofen products while taking this medication.         It is ok to take acetaminophen (Tylenol) while taking this medication. If you have regular strength tylenol may take 3 tabs up to 3 times daily. If you have extra-strength tylenol may take 2 tabs up to 3 times daily.       Go to physical therapy as referred.     If no improvement with physical therapy see Dr. Perez.

## 2024-05-08 ENCOUNTER — LAB ENCOUNTER (OUTPATIENT)
Dept: LAB | Facility: HOSPITAL | Age: 69
End: 2024-05-08
Attending: NURSE PRACTITIONER
Payer: MEDICARE

## 2024-05-08 DIAGNOSIS — R42 POSITIONAL LIGHTHEADEDNESS: ICD-10-CM

## 2024-05-08 LAB
ALBUMIN SERPL-MCNC: 3.7 G/DL (ref 3.4–5)
ALBUMIN/GLOB SERPL: 1.2 {RATIO} (ref 1–2)
ALP LIVER SERPL-CCNC: 83 U/L
ALT SERPL-CCNC: 17 U/L
ANION GAP SERPL CALC-SCNC: 3 MMOL/L (ref 0–18)
AST SERPL-CCNC: 18 U/L (ref 15–37)
BASOPHILS # BLD AUTO: 0.02 X10(3) UL (ref 0–0.2)
BASOPHILS NFR BLD AUTO: 0.5 %
BILIRUB SERPL-MCNC: 0.6 MG/DL (ref 0.1–2)
BUN BLD-MCNC: 19 MG/DL (ref 9–23)
CALCIUM BLD-MCNC: 10.3 MG/DL (ref 8.5–10.1)
CHLORIDE SERPL-SCNC: 109 MMOL/L (ref 98–112)
CO2 SERPL-SCNC: 28 MMOL/L (ref 21–32)
CREAT BLD-MCNC: 0.67 MG/DL
EGFRCR SERPLBLD CKD-EPI 2021: 96 ML/MIN/1.73M2 (ref 60–?)
EOSINOPHIL # BLD AUTO: 0.14 X10(3) UL (ref 0–0.7)
EOSINOPHIL NFR BLD AUTO: 3.8 %
ERYTHROCYTE [DISTWIDTH] IN BLOOD BY AUTOMATED COUNT: 13.4 %
FASTING STATUS PATIENT QL REPORTED: YES
GLOBULIN PLAS-MCNC: 3.2 G/DL (ref 2.8–4.4)
GLUCOSE BLD-MCNC: 91 MG/DL (ref 70–99)
HCT VFR BLD AUTO: 39.7 %
HGB BLD-MCNC: 13.5 G/DL
IMM GRANULOCYTES # BLD AUTO: 0 X10(3) UL (ref 0–1)
IMM GRANULOCYTES NFR BLD: 0 %
LYMPHOCYTES # BLD AUTO: 1.63 X10(3) UL (ref 1–4)
LYMPHOCYTES NFR BLD AUTO: 44.3 %
MCH RBC QN AUTO: 29.9 PG (ref 26–34)
MCHC RBC AUTO-ENTMCNC: 34 G/DL (ref 31–37)
MCV RBC AUTO: 87.8 FL
MONOCYTES # BLD AUTO: 0.42 X10(3) UL (ref 0.1–1)
MONOCYTES NFR BLD AUTO: 11.4 %
NEUTROPHILS # BLD AUTO: 1.47 X10 (3) UL (ref 1.5–7.7)
NEUTROPHILS # BLD AUTO: 1.47 X10(3) UL (ref 1.5–7.7)
NEUTROPHILS NFR BLD AUTO: 40 %
OSMOLALITY SERPL CALC.SUM OF ELEC: 292 MOSM/KG (ref 275–295)
PLATELET # BLD AUTO: 203 10(3)UL (ref 150–450)
POTASSIUM SERPL-SCNC: 4.2 MMOL/L (ref 3.5–5.1)
PROT SERPL-MCNC: 6.9 G/DL (ref 6.4–8.2)
RBC # BLD AUTO: 4.52 X10(6)UL
SODIUM SERPL-SCNC: 140 MMOL/L (ref 136–145)
WBC # BLD AUTO: 3.7 X10(3) UL (ref 4–11)

## 2024-05-08 PROCEDURE — 85025 COMPLETE CBC W/AUTO DIFF WBC: CPT

## 2024-05-08 PROCEDURE — 36415 COLL VENOUS BLD VENIPUNCTURE: CPT

## 2024-05-08 PROCEDURE — 80053 COMPREHEN METABOLIC PANEL: CPT

## 2024-06-03 NOTE — TELEPHONE ENCOUNTER
Emergency Department Physician Note    Patient: Wallace Gonzalez Age: 81 year old Sex: male   MRN: [id number] : 1943 Encounter Date: 2024       HISTORY OF PRESENT ILLNESS  This is an 81 year old male with a history of HTN and HLD presenting to the ED for difficulty breathing through his nose due to nasal congestion. He states he has been unable to sleep because of the difficulty breathing, and he has used nasal spray with no relief. He reports a mild cough, but denies chest pain, dizziness, light-headedness, fever, or exposure to sick contacts. He states he has no history of cardiac or pulmonary disease, nor any history of DM.     REVIEW OF SYSTEMS  As per HPI         PAST HISTORY       No past medical history on file. No past surgical history on file.          No family history on file.    No Known Allergies        Additional Information:     PHYSICAL EXAMINATION  ED Triage Vitals [24]   BP (!) 148/66   Heart Rate 72   Resp 19   Temp 97.7 °F (36.5 °C)   SpO2 96 %       GEN: Patient is generally well-appearing, in no acute distress  HEENT: Clear rhinorrhea of his bilateral nares. Pupils equally round and reactive to light bilaterally, extraocular muscles intact, moist mucous membranes  CV: Regular rate and rhythm, no murmurs rubs or gallops, no extra heart sounds  PULM: No increased work of breathing, lungs are clear to auscultation bilaterally  GI: Abdomen is soft, nontender, nondistended  : No suprapubic tenderness, no CVA tenderness  MSK: No clear bony abnormalities  SKIN: No obvious lesions, no ecchymoses  NEURO: Patient is spontaneously moving all limbs with no focal neurologic abnormalities  PSYCH: Patient converses appropriately, displays appropriate mood    Imaging:   XR CHEST PA AND LATERAL 2 VIEWS   Final Result   FINDINGS/IMPRESSION:      There is relative elevation of the right hemidiaphragm.      Hazy opacity seen at the left lung base with partial obscuration of the  Patient coming for a physical in August please order labs at [address]. Thank you.   hemidiaphragm, correlate for the infiltrates of pneumonia. See marked   image. Upper lung zones are clear.      Heart size is normal.      No significant bony abnormality is seen.      FOR PHYSICIAN USE ONLY - Please note that this report was generated using   voice recognition software.  If you require clarification or feel that   there has been an error in this report please contact me through [employer].  Thank you very much for allowing me to participate in the care of   your patient.          Electronically Signed by: ELIZABETH MATHEW M.D.    Signed on: 6/2/2024 8:28 PM    Workstation ID: [id number]            MEDICAL DECISION MAKING  This is a 81 year old male no significant past medical history evaluated for nasal congestion  Patient is well-appearing, nontoxic  Vital signs are within normal limits  No conversational dyspnea, vital signs with no hypoxia or tachycardia.  Patient would just like relief of his nasal congestion.  Will give Afrin spray.  Will obtain x-rays, labs unremarkable.    Limitations to history/exam/care: none  Social factors impacting care: none known   External records reviewed: none available          ED Course as of 06/03/24 0152   Sun Jun 02, 2024   2323 EKG normal sinus rhythm 65 bpm normal axis no acute ST segment changes T wave flattening throughout QTc 361 [TA]      ED Course User Index  [TA] Freeman Chavez MD         IMPRESSION AND PLAN  ED Diagnosis   1. Congestion of nasal sinus        2. Pneumonia of left lower lobe due to infectious organism          Disposition: Home     I participated in the care of this patient and this note provides additional information regarding their visit.   The documentation recorded by the scribe accurately and completely reflects the service(s) I personally performed and the decisions made by me.        Dipti Chavez MD  Emergency Medicine     On 6/2/2024, I, Pineda Rounds, accurately documented the service(s) personally performed  and the decisions made by Dr. Chavez.             Freeman Chavez MD  06/03/24 0153

## 2024-07-07 ENCOUNTER — HOSPITAL ENCOUNTER (OUTPATIENT)
Age: 69
Discharge: HOME | End: 2024-07-07
Payer: MEDICARE

## 2024-07-07 VITALS
DIASTOLIC BLOOD PRESSURE: 75 MMHG | OXYGEN SATURATION: 100 % | WEIGHT: 124 LBS | TEMPERATURE: 99 F | RESPIRATION RATE: 22 BRPM | HEART RATE: 63 BPM | BODY MASS INDEX: 20.66 KG/M2 | SYSTOLIC BLOOD PRESSURE: 101 MMHG | HEIGHT: 65 IN

## 2024-07-07 DIAGNOSIS — N30.01 ACUTE CYSTITIS WITH HEMATURIA: Primary | ICD-10-CM

## 2024-07-07 LAB
BILIRUB UR QL STRIP: NEGATIVE
GLUCOSE UR STRIP-MCNC: NEGATIVE MG/DL
KETONES UR STRIP-MCNC: NEGATIVE MG/DL
NITRITE UR QL STRIP: NEGATIVE
PH UR STRIP: 7 [PH]
PROT UR STRIP-MCNC: 100 MG/DL
SP GR UR STRIP: 1.01
UROBILINOGEN UR STRIP-ACNC: <2 MG/DL

## 2024-07-07 PROCEDURE — 81002 URINALYSIS NONAUTO W/O SCOPE: CPT | Performed by: PHYSICIAN ASSISTANT

## 2024-07-07 PROCEDURE — 99214 OFFICE O/P EST MOD 30 MIN: CPT | Performed by: PHYSICIAN ASSISTANT

## 2024-07-07 RX ORDER — NITROFURANTOIN 25; 75 MG/1; MG/1
100 CAPSULE ORAL 2 TIMES DAILY
Qty: 10 CAPSULE | Refills: 0 | Status: SHIPPED | OUTPATIENT
Start: 2024-07-07 | End: 2024-07-12

## 2024-07-07 NOTE — ED PROVIDER NOTES
Patient Seen in: [facility]      History     Chief Complaint   Patient presents with    Urinary Symptoms     Stated Complaint: Blood in urine    Subjective:   HPI    68-year-old female with known history of chronic hematuria and urinary tract infections.  She has seen urology and also vascular surgery.  Patient states that this morning though she woke up with urinary frequency urgency dysuria and gross hematuria.  Patient denies back pain nausea vomiting fevers or chills.  Patient does state that she has had some GERD symptoms but the symptoms have been present now for the past 24 hours has been taking omeprazole and does feel as though the symptoms are improving.  She states this normally happens when she drinks too much caffeine which she has been drinking.    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 No abn pap.    Personal history of colonic polyps    Postmenopausal vaginal bleeding    10/13/22 PCP annual exam. Vaginal dryness. Rx Estradiol cream 0.5 grams intravaginally 2 times per week, then twice weekly  10/25/22 Cramping & brown discharge. Had not started estradiol 11/16/22 Pelvic US - Mid-position to slightly retroverted uterus 6.8 x 5.0 x 2.7 cm.  Endometrium 7 mm. Ovaries normal. No free fluid.     Primary insomnia    toruble stayinging.    Sepsis due to urinary tract infection (HCC)    uro sepsis    Sleep disturbance    insomnia/difficulty staying asleep    Umbilical hernia    Umbilical hernia, incarcerated    Vaginal dryness    Vaginal dryness: Has been having pain with intercourse due to vaginal dryness. Has  tried lubricants with only mild improvement. She has also noticed small spots of blood on toilet paper after intercourse with this before as well.    Varicose vein of leg    Wears glasses              Past Surgical History:   Procedure Laterality Date    Colonoscopy & polypectomy  01/22/2015    hyperplastic polyp, single non bleeding 6 mm AVM of cecum.    Cystourethroscopy  03/06/2022    Dr. Dick Camp - for gross hematuria & recurrent UTI. Cystoscopy today unrevealing for concerning pathology.  Small submucosal nodule at dome does not appear suspicious for malignancy. 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    Hernia surgery  06/15/2017    umbilical hernia repair    Ir varicose vein endovenous laser ablation(cpt=36478) Left 2017    left greater saphenous vein endovenous radiofrequency ablation for venous incompetence and symptomatic varicose veins    Other surgical history      ventricular septal defect repair    Other surgical history  03/16/2022    Cystoscopy-     Tonsillectomy  1972                Social History     Socioeconomic History    Marital status:    Tobacco Use    Smoking status: Never    Smokeless tobacco: Never   Vaping Use    Vaping status: Never Used   Substance and Sexual Activity    Alcohol use: Yes     Alcohol/week: 0.0 standard drinks of alcohol     Comment: 2-3 drinks a month    Drug use: No   Other Topics Concern    Caffeine Concern Yes     Comment: 2 tea daily, coffee    Sleep Concern Yes     Comment: wakes during the night    Exercise Yes     Comment: 4 times weekly- runs,walks biking    Seat Belt Yes    Self-Exams No              Review of Systems    Positive for stated Chief Complaint: Urinary Symptoms    Other systems are as noted in HPI.  Constitutional and vital signs reviewed.      All other systems reviewed and negative except as noted above.    Physical Exam     ED Triage Vitals [07/07/24 0858]   /75   Pulse 63   Resp 22   Temp  98.6 °F (37 °C)   Temp src Temporal   SpO2 100 %   O2 Device None (Room air)       Current Vitals:   Vital Signs  BP: 101/75  Pulse: 63  Resp: 22  Temp: 98.6 °F (37 °C)  Temp src: Temporal    Oxygen Therapy  SpO2: 100 %  O2 Device: None (Room air)            Physical Exam    General Appearance:  Alert and orientated x 4, cooperative, no distress, appropriate for age  Head:  Normocephalic, atraumatic, without obvious abnormality  Neck:  Supple; symmetrical  Abdomen:  Soft, mild suprapubic tenderness,  no mass or organomegaly. No rebound tenderness or guarding, negative CVA bilaterally                Skin/Hair/Nails:  Skin warm, dry and intact, no rashes or abnormal dyspigmentation  Neurologic:   Grossly intact, gait normal    ED Course     Labs Reviewed   [facility] POCT URINALYSIS DIPSTICK - Abnormal; Notable for the following components:       Result Value    Urine Color Swati (*)     Urine Clarity Cloudy (*)     Protein urine 100 (*)     Blood, Urine Large (*)     Leukocyte esterase urine Large (*)     All other components within normal limits   URINE CULTURE, ROUTINE            MDM          Medical Decision Making  68-year-old female who comes in today complaining of urinary frequency urgency and dysuria.    Problems Addressed:  Acute cystitis with hematuria: acute illness or injury    Amount and/or Complexity of Data Reviewed  Labs: ordered. Decision-making details documented in ED Course.     Details: UA that is cloudy with 100 protein large blood and large leuks sent for urine culture    Risk  OTC drugs.  Prescription drug management.  Risk Details: Clinical impression: UTI  Differential diagnosis: Dysuria, urethritis, acute cystitis, pyelonephritis  Plan: The patient's urine culture is pending.  She will be started on marobid for 5 days.  She is instructed to return if she develops nausea, vomiting, fever or back pain.  She will be contacted regarding the urine culture if her antibiotic needs to be changed.  The  patient is in good condition throughout her visit today and remains so upon discharge.  I discuss the plan of care with the patient, who expresses understanding.  All questions and concerns are addressed to the patient's satisfaction prior to discharge today.          Disposition and Plan     Clinical Impression:  1. Acute cystitis with hematuria         Disposition:  Discharge  7/7/2024  9:28 am    Follow-up:  Nehemiah Irene MD  [address]   [facility] [id number] [phone]    Schedule an appointment as soon as possible for a visit   If symptoms worsen          Medications Prescribed:  Discharge Medication List as of 7/7/2024  9:31 AM        START taking these medications    Details   nitrofurantoin monohydrate macro 100 MG Oral Cap Take 1 capsule (100 mg total) by mouth 2 (two) times daily for 5 days., Normal, Disp-10 capsule, R-0

## 2024-07-07 NOTE — ED INITIAL ASSESSMENT (HPI)
Hematuria and burning with urination x this AM   States she has seen urology this year   Has some nausea and heartburn   Took omeprazole for GI symptoms

## 2024-07-18 DIAGNOSIS — N95.1 MENOPAUSAL VAGINAL DRYNESS: ICD-10-CM

## 2024-07-18 DIAGNOSIS — N94.10 DYSPAREUNIA IN FEMALE: ICD-10-CM

## 2024-07-18 NOTE — TELEPHONE ENCOUNTER
Requested Prescriptions     Pending Prescriptions Disp Refills    estradiol (ESTRACE) 0.1 MG/GM Vaginal Cream 42.5 g 2     Si.5 g of cream intravaginally administered daily for 2 weeks, then reduce to twice weekly     LOV 2024     Patient was asked to follow-up in: Follow-up not documented in note    Appointment scheduled: 2024 Cheryl Savage DO     Medication failed protocol due to:   PASS--PENDING LAST PAP WNL--VIA MANUAL LOOKUP       Routed to Janette Luna DO, for review.

## 2024-07-25 RX ORDER — ESTRADIOL 0.1 MG/G
CREAM VAGINAL
Qty: 42.5 G | Refills: 2 | Status: SHIPPED | OUTPATIENT
Start: 2024-07-25

## 2024-07-25 NOTE — TELEPHONE ENCOUNTER
Patient is out of her script and was calling for status on prescription estradiol (ESTRACE) 0.1 MG/GM Vaginal Cream [Cheryl Papuga]     12/9/2024      [facility] [address] [place] [address] [address] [phone]       Please call patient or a message on [employer] so she knows this is getting addressed.

## 2024-08-14 ENCOUNTER — HOSPITAL ENCOUNTER (OUTPATIENT)
Age: 69
Discharge: HOME | End: 2024-08-14
Payer: MEDICARE

## 2024-08-14 VITALS
SYSTOLIC BLOOD PRESSURE: 106 MMHG | BODY MASS INDEX: 21 KG/M2 | OXYGEN SATURATION: 97 % | HEART RATE: 82 BPM | WEIGHT: 125 LBS | RESPIRATION RATE: 18 BRPM | DIASTOLIC BLOOD PRESSURE: 72 MMHG | TEMPERATURE: 99 F

## 2024-08-14 DIAGNOSIS — L03.221 CELLULITIS OF NECK: Primary | ICD-10-CM

## 2024-08-14 PROCEDURE — 99213 OFFICE O/P EST LOW 20 MIN: CPT | Performed by: PHYSICIAN ASSISTANT

## 2024-08-14 RX ORDER — CEPHALEXIN 500 MG/1
500 CAPSULE ORAL 4 TIMES DAILY
Qty: 28 CAPSULE | Refills: 0 | Status: SHIPPED | OUTPATIENT
Start: 2024-08-14 | End: 2024-08-21

## 2024-08-14 NOTE — ED PROVIDER NOTES
Patient Seen in: [facility]mediate [facility]      History     Chief Complaint   Patient presents with    Bite Sting,Insect     Stated Complaint: bug bite    Subjective:   HPI    67 YO female presents to immediate care for evaluation of increased redness at bug bite site sustained 4 days ago. Patient endorses concern for cellulitis. Reports cellulitis in the past from bug bite and recent symptoms feel similar. She denies fever/chills or any other systemic symptoms.        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 No abn pap.    Personal history of colonic polyps    Postmenopausal vaginal bleeding    10/13/22 PCP annual exam. Vaginal dryness. Rx Estradiol cream 0.5 grams intravaginally 2 times per week, then twice weekly  10/25/22 Cramping & brown discharge. Had not started estradiol 11/16/22 Pelvic US - Mid-position to slightly retroverted uterus 6.8 x 5.0 x 2.7 cm.  Endometrium 7 mm. Ovaries normal. No free fluid.     Primary insomnia    toruble stayinging.    Sepsis due to urinary tract infection (HCC)    uro sepsis    Sleep disturbance    insomnia/difficulty staying asleep    Umbilical hernia    Umbilical hernia, incarcerated    Vaginal dryness    Vaginal dryness: Has been having pain with intercourse due to vaginal dryness. Has tried lubricants with only mild improvement. She has also noticed small spots of blood on toilet paper after intercourse with this before as well.    Varicose vein of leg    Wears glasses              Past Surgical History:   Procedure Laterality Date    Colonoscopy & polypectomy  01/22/2015    hyperplastic  polyp, single non bleeding 6 mm AVM of cecum.    Cystourethroscopy  03/06/2022    Dr. Dick Camp - for gross hematuria & recurrent UTI. Cystoscopy today unrevealing for concerning pathology.  Small submucosal nodule at dome does not appear suspicious for malignancy. 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    Hernia surgery  06/15/2017    umbilical hernia repair    Ir varicose vein endovenous laser ablation(cpt=36478) Left 2017    left greater saphenous vein endovenous radiofrequency ablation for venous incompetence and symptomatic varicose veins    Other surgical history      ventricular septal defect repair    Other surgical history  03/16/2022    Cystoscopy-     Tonsillectomy  1972                Social History     Socioeconomic History    Marital status:    Tobacco Use    Smoking status: Never    Smokeless tobacco: Never   Vaping Use    Vaping status: Never Used   Substance and Sexual Activity    Alcohol use: Yes     Alcohol/week: 0.0 standard drinks of alcohol     Comment: 2-3 drinks a month    Drug use: No   Other Topics Concern    Caffeine Concern Yes     Comment: 2 tea daily, coffee    Sleep Concern Yes     Comment: wakes during the night    Exercise Yes     Comment: 4 times weekly- runs,walks biking    Seat Belt Yes    Self-Exams No              Review of Systems    Positive for stated Chief Complaint: Bite Sting,Insect    Other systems are as noted in HPI.  Constitutional and vital signs reviewed.      All other systems reviewed and negative except as noted above.    Physical Exam     ED Triage Vitals [08/14/24 1338]   /72   Pulse 82   Resp 18   Temp 98.6 °F (37 °C)   Temp src Temporal   SpO2 97 %   O2 Device None (Room air)       Current Vitals:   Vital Signs  BP: 106/72  Pulse: 82  Resp: 18  Temp: 98.6 °F (37 °C)  Temp src: Temporal    Oxygen Therapy  SpO2: 97 %  O2 Device: None (Room air)            Physical Exam  Vitals and nursing note reviewed.    Constitutional:       General: She is not in acute distress.     Appearance: Normal appearance. She is not ill-appearing, toxic-appearing or diaphoretic.   Cardiovascular:      Rate and Rhythm: Normal rate.   Pulmonary:      Effort: Pulmonary effort is normal. No respiratory distress.   Skin:     Findings: Erythema (erythematous region at the base of the right anterior neck with central punctum, surrounding erythema, very mild tenderness. Erythematous region approximately 4 cm in diameter) present.   Neurological:      Mental Status: She is alert and oriented to person, place, and time.   Psychiatric:         Mood and Affect: Mood normal.         Behavior: Behavior normal.           ED Course   Labs Reviewed - No data to display      MDM      Differential diagnosis considered but not limited to local skin reaction vs cellulitis vs other    Afebrile and nontoxic in appearance. Physical exam as above is concerning for early onset cellulitis. Keflex prescribed.  Return instructions discussed with patient understanding.      Medical Decision Making  Risk  Prescription drug management.        Disposition and Plan     Clinical Impression:  1. Cellulitis of neck         Disposition:  Discharge  8/14/2024  2:11 pm    Follow-up:  [facility] [address] [address]  [phone]    If symptoms worsen          Medications Prescribed:  Discharge Medication List as of 8/14/2024  2:18 PM        START taking these medications    Details   cephalexin 500 MG Oral Cap Take 1 capsule (500 mg total) by mouth 4 (four) times daily for 7 days., Normal, Disp-28 capsule, R-0

## 2024-08-14 NOTE — ED INITIAL ASSESSMENT (HPI)
Pt c/o bug bite to the right side of her neck. Pt states symptoms started on Saturday. Pt state the redness is spreading.

## 2024-11-13 ENCOUNTER — TELEPHONE (OUTPATIENT)
Dept: FAMILY MEDICINE CLINIC | Facility: CLINIC | Age: 69
End: 2024-11-13

## 2024-11-13 DIAGNOSIS — Z12.31 VISIT FOR SCREENING MAMMOGRAM: Primary | ICD-10-CM

## 2024-11-13 DIAGNOSIS — E78.5 DYSLIPIDEMIA: ICD-10-CM

## 2024-11-13 DIAGNOSIS — E61.1 IRON DEFICIENCY: ICD-10-CM

## 2024-11-13 NOTE — TELEPHONE ENCOUNTER
Please enter lab orders for the patient's upcoming physical appointment.     Physical scheduled:   Your appointments       Date & Time Appointment Department ([place])    Dec 09, 2024 2:40 PM CST Medicare Annual Well Visit with Cheryl Savage DO [facility], [facility] ([facility])              [facility]  [address] [place] [address] [place] [id number]  [phone]           Preferred lab: [facility] ([facility])     The patient has been notified to complete fasting labs prior to their physical appointment.

## 2024-11-14 NOTE — TELEPHONE ENCOUNTER
1. Visit for screening mammogram (Primary)  -     Mammogram Bilateral Screening with 3D; Future; Expected date: 11/13/2024  2. Dyslipidemia  -     TSH W Reflex To Free T4; Future; Expected date: 11/13/2024  -     Lipid Panel; Future; Expected date: 11/13/2024  -     Comp Metabolic Panel (14); Future; Expected date: 11/13/2024  3. Iron deficiency  -     CBC With Differential With Platelet; Future; Expected date: 11/13/2024  -     Ferritin; Future; Expected date: 11/13/2024  -     Iron And Tibc; Future; Expected date: 11/13/2024       OK to notify. Thanks, Keo Irene MD

## 2024-12-09 ENCOUNTER — OFFICE VISIT (OUTPATIENT)
Dept: FAMILY MEDICINE CLINIC | Facility: CLINIC | Age: 69
End: 2024-12-09
Payer: MEDICARE

## 2024-12-09 VITALS
RESPIRATION RATE: 16 BRPM | BODY MASS INDEX: 21.02 KG/M2 | OXYGEN SATURATION: 100 % | SYSTOLIC BLOOD PRESSURE: 98 MMHG | TEMPERATURE: 99 F | WEIGHT: 126.19 LBS | DIASTOLIC BLOOD PRESSURE: 60 MMHG | HEIGHT: 64.8 IN | HEART RATE: 82 BPM

## 2024-12-09 DIAGNOSIS — Z00.00 ENCOUNTER FOR ANNUAL HEALTH EXAMINATION: Primary | ICD-10-CM

## 2024-12-09 DIAGNOSIS — J45.990 EXERCISE-INDUCED ASTHMA (HCC): ICD-10-CM

## 2024-12-09 DIAGNOSIS — R07.9 EXERTIONAL CHEST PAIN: ICD-10-CM

## 2024-12-09 DIAGNOSIS — D70.8 OTHER NEUTROPENIA (HCC): ICD-10-CM

## 2024-12-09 DIAGNOSIS — Z78.0 POSTMENOPAUSAL: ICD-10-CM

## 2024-12-09 DIAGNOSIS — E83.52 SERUM CALCIUM ELEVATED: ICD-10-CM

## 2024-12-09 PROCEDURE — 99213 OFFICE O/P EST LOW 20 MIN: CPT | Performed by: FAMILY MEDICINE

## 2024-12-09 PROCEDURE — G0439 PPPS, SUBSEQ VISIT: HCPCS | Performed by: FAMILY MEDICINE

## 2024-12-09 NOTE — PROGRESS NOTES
Subjective:   Klarissa Weber is a 69 year old female who presents for a Subsequent Annual Wellness visit (Pt already had Initial Annual Wellness) and scheduled follow up of multiple significant but stable problems.     Taking magnesium citrate. Wondering if this is the correct supplement.     Notes that when she gets her heart rate up (upper 150s) will not feel well. As soon as heart rate comes back down, symptoms resolve.     Health Maintenance:  Vaccines: reviewed as below. Indicated today: none  Immunization History   Administered Date(s) Administered    Covid-19 Vaccine Pfizer 30 mcg/0.3 ml 01/15/2021, 2021, 10/01/2021    Covid-19 Vaccine Pfizer Aleksey-Sucrose 30 mcg/0.3 ml 2022    FLU VAC High Dose 65 YRS & Older PRSV Free (14636) 10/13/2022    FLULAVAL 6 months & older 0.5 ml Prefilled syringe (18106) 2017, 10/04/2018, 2021    FLUZONE 6 months and older PFS 0.5 ml (63120) 10/04/2018, 10/27/2020, 2021    Fluarix 6 Months And Older 0.5 ml prefilled syringe (87867) 10/27/2020    Flublok Quad Influenza Vaccine (04540) 2019    HEP A 08/10/2011, 2012    Influenza 2019, 10/17/2023, 10/17/2024    Pneumococcal Conjugate PCV20 10/13/2022    TDAP 08/10/2011, 10/21/2021    Zoster Vaccine Recombinant Adjuvanted (Shingrix) 2019, 2019     Obesity screening: Body mass index is 21.13 kg/m².  Diabetes screening:  due  Hypercholesterolemia screening:  Lab Results   Component Value Date    HDL 91 (H) 2024     (H) 2023    HDL 99 (H) 10/05/2022     Lab Results   Component Value Date     (H) 2024     (H) 2023     (H) 10/05/2022     Lab Results   Component Value Date    TRIG 74 2024    TRIG 69 2023    TRIG 70 10/05/2022        Depression screen: Notes that daughter has some health issues. Talking to a therapist.  Cervical Cancer screening: Last Pap: n/a  Colon Cancer screening: Last screenin  Breast  Cancer screening: Last mammogram: 12/18/2023\  Osteoporosis: Last DEXA: 11/2022    History/Other:   Fall Risk Assessment:   She has been screened for Falls and is low risk.      Cognitive Assessment:   She had a completely normal cognitive assessment - see flowsheet entries     Functional Ability/Status:   Klarissa Weber has a completely normal functional assessment. See flowsheet for details.      Depression Screening (PHQ):  PHQ-2 SCORE: 0  , done 12/17/2024             Advanced Directives:   She does have a Living Will but we do NOT have it on file in Epic.    She has a Power of  for Health Care on file in [facility].  Discussed Advance Care Planning with patient (and family/surrogate if present). Standard forms made available to patient in After Visit Summary.      Patient Active Problem List   Diagnosis    Chronic venous stasis dermatitis of both lower extremities    Primary insomnia    Personal history of colonic polyps    Postmenopausal vaginal bleeding     Allergies:  She is allergic to lamisil [terbinafine].    Current Medications:  Outpatient Medications Marked as Taking for the 12/9/24 encounter (Office Visit) with Cheryl Savage DO   Medication Sig    estradiol (ESTRACE) 0.1 MG/GM Vaginal Cream 0.5 g of cream intravaginally administered daily for 2 weeks, then reduce to twice weekly    ipratropium 0.06 % Nasal Solution 1 spray by Nasal route 2 (two) times daily.    Calcium-Magnesium-Vitamin D (CALCIUM 1200+D3 OR) Take by mouth.    CRANBERRY OR Take by mouth.    Vitamin D3 2000 units Oral Cap Take 1 capsule (2,000 Units total) by mouth daily.    magnesium 250 MG Oral Tab Take 1 tablet (250 mg total) by mouth.     Current Facility-Administered Medications for the 12/9/24 encounter (Office Visit) with hCeryl Savage DO   Medication    Albuterol Sulfate (VENTOLIN) (2.5 MG/3ML) 0.083% nebulizer solution 2.5 mg       Medical History:  She  has a past medical history of Anemia (06/29/2012), Anterior  uveitis, Cataracts, both eyes (11/2021), Cervicalgia, Chronic venous stasis dermatitis of both lower extremities (10/14/2016), Dense breasts (11/21/2022), Iritis, Osteopenia (11/21/2022), Pap smear for cervical cancer screening (09/29/2017), Personal history of colonic polyps (01/24/2020), Postmenopausal vaginal bleeding (10/25/2022), Primary insomnia (10/04/2018), Sepsis due to urinary tract infection (HCC) (2009), Sleep disturbance, Umbilical hernia, Umbilical hernia, incarcerated (10/14/2016), Vaginal dryness (05/16/2019), Varicose vein of leg, and Wears glasses.  Surgical History:  She  has a past surgical history that includes tonsillectomy (1972); colonoscopy & polypectomy (01/22/2015); hernia surgery (06/15/2017); other surgical history; other surgical history (03/16/2022); cystourethroscopy (03/06/2022); and ir varicose vein endovenous laser ablation(cpt=36478) (Left, 2017).   Family History:  Her family history includes Breast Cancer in her paternal aunt; Dementia in her father; Hypertension in her mother; No Known Problems in her maternal grandfather, maternal grandmother, paternal grandfather, and paternal grandmother; Prostate Cancer in her brother; Stroke in her father and mother; parvez danlos syndrome (age of onset: 34) in her daughter; sleep apnea in her son.  Social History:  She  reports that she has never smoked. She has never used smokeless tobacco. She reports current alcohol use. She reports that she does not use drugs.    Tobacco:  She has never smoked tobacco.    CAGE Alcohol Screen:   CAGE screening score of 0 on 12/9/2024, showing low risk of alcohol abuse.      Patient Care Team:  Nehemiah Irene MD as PCP - General (Family Practice)  Alfredo London MD (GASTROENTEROLOGY)  Deedee Bourgeois PT as Physical Therapist (Physical Therapy)    Review of Systems     Negative except above    Objective:   Physical Exam  General Appearance:  Alert, cooperative, no distress, appears stated age    Head:  Normocephalic, without obvious abnormality, atraumatic   Eyes:  conjunctiva/corneas clear, EOM's intact both eyes   Ears:  Normal TM's and external ear canals, both ears   Throat: Lips, mucosa, and tongue normal; teeth and gums normal   Neck: Supple, symmetrical, trachea midline, no adenopathy;  thyroid: not enlarged, symmetric, no tenderness/mass/nodules; no carotid bruit or JVD   Lungs:   Clear to auscultation bilaterally, respirations unlabored   Heart:  Regular rate and rhythm, S1 and S2 normal, no murmur, rub, or gallop   Abdomen:   Soft, non-tender, bowel sounds active all four quadrants,  no masses, no organomegaly   Pelvic: Deferred   Extremities: Extremities normal, atraumatic, no cyanosis or edema   Neurologic: Grossly Normal       BP 98/60 (BP Location: Right arm)   Pulse 82   Temp 98.7 °F (37.1 °C) (Oral)   Resp 16   Ht 5' 4.8\" (1.646 m)   Wt 126 lb 3.2 oz (57.2 kg)   SpO2 100%   Breastfeeding No   BMI 21.13 kg/m²  Estimated body mass index is 21.13 kg/m² as calculated from the following:    Height as of this encounter: 5' 4.8\" (1.646 m).    Weight as of this encounter: 126 lb 3.2 oz (57.2 kg).    Medicare Hearing Assessment:   Hearing Screening    Time taken: 12/9/2024  2:43 PM  Screening Method: Whisper Test  Whisper Test Result: Pass (Comment: hearing aids both ear)         Visual Acuity:   Right Eye Visual Acuity: Corrected Right Eye Chart Acuity: 20/30   Left Eye Visual Acuity: Corrected Left Eye Chart Acuity: 20/25   Both Eyes Visual Acuity: Corrected Both Eyes Chart Acuity: 20/25   Able To Tolerate Visual Acuity: Yes        Assessment & Plan:   Klarissa Weber is a 69 year old female who presents for a Medicare Assessment.     1. Routine health maintenance (Primary)  -     CBC With Differential With Platelet; Future; Expected date: 12/09/2024  -     Comp Metabolic Panel (14); Future; Expected date: 12/09/2024  -     Lipid Panel; Future; Expected date: 12/09/2024  -     TSH W  Reflex To Free T4; Future; Expected date: 12/09/2024  -     Hemoglobin A1C; Future; Expected date: 12/09/2024  2. Serum calcium elevated  -    Will follow-up with PTH, Intact; Future; Expected date: 12/09/2024  3. Exertional chest pain  -     Discussed that developing exercise intolerance once heart rate hits 150 could be evidence of ischemic heart disease.  Will evaluate with CARD TREADMILL STRESS, ADULT (CPT=93017); Future; Expected date: 12/09/2024  4. Postmenopausal  -     XR DEXA BONE DENSITOMETRY (CPT=77080); Future; Expected date: 12/09/2024  5.Neutropenia  Seem to be baseline.  Will continue to monitor annually with CBC  6.Exercise induced asthma  To use albuterol as needed.  Controlled    The patient indicates understanding of these issues and agrees to the plan.  Continue with current treatment plan.  Reinforced healthy diet, lifestyle, and exercise.    RTC in 1 year  Cheryl Savage DO, 12/9/2024     Supplementary Documentation:   General Health:  In the past six months, have you lost more than 10 pounds without trying?: 2 - No  Has your appetite been poor?: No  Type of Diet: Balanced  How does the patient maintain a good energy level?: Appropriate Exercise;Daily Walks;Stretching  How would you describe your daily physical activity?: Moderate  How would you describe your current health state?: Good  How do you maintain positive mental well-being?: Social Interaction;Puzzles;Visiting Friends  On a scale of 0 to 10, with 0 being no pain and 10 being severe pain, what is your pain level?: (Patient-Rptd) 1 - (Mild)  In the past six months, have you experienced urine leakage?: 0-No  At any time do you feel concerned for the safety/well-being of yourself and/or your children, in your home or elsewhere?: No  Have you had any immunizations at another office such as Influenza, Hepatitis B, Tetanus, or Pneumococcal?: Yes    Health Maintenance   Topic Date Due    Annual Depression Screening  01/01/2024    COVID-19  Vaccine (5 - 2024-25 season) 09/01/2024    Annual Physical  12/08/2024    Mammogram  12/18/2024    Colorectal Cancer Screening  01/24/2030    Influenza Vaccine  Completed    DEXA Scan  Completed    Fall Risk Screening (Annual)  Completed    Pneumococcal Vaccine: 65+ Years  Completed    Zoster Vaccines  Completed

## 2024-12-11 ENCOUNTER — LAB ENCOUNTER (OUTPATIENT)
Dept: LAB | Age: 69
End: 2024-12-11
Attending: FAMILY MEDICINE
Payer: MEDICARE

## 2024-12-11 DIAGNOSIS — E83.52 SERUM CALCIUM ELEVATED: ICD-10-CM

## 2024-12-11 DIAGNOSIS — Z00.00 ROUTINE HEALTH MAINTENANCE: ICD-10-CM

## 2024-12-11 LAB
ALBUMIN SERPL-MCNC: 4 G/DL (ref 3.2–4.8)
ALBUMIN/GLOB SERPL: 1.4 {RATIO} (ref 1–2)
ALP LIVER SERPL-CCNC: 72 U/L
ALT SERPL-CCNC: 13 U/L
ANION GAP SERPL CALC-SCNC: 4 MMOL/L (ref 0–18)
AST SERPL-CCNC: 19 U/L (ref ?–34)
BASOPHILS # BLD AUTO: 0.03 X10(3) UL (ref 0–0.2)
BASOPHILS NFR BLD AUTO: 0.9 %
BILIRUB SERPL-MCNC: 0.9 MG/DL (ref 0.2–1.1)
BUN BLD-MCNC: 18 MG/DL (ref 9–23)
CALCIUM BLD-MCNC: 10.1 MG/DL (ref 8.7–10.4)
CHLORIDE SERPL-SCNC: 107 MMOL/L (ref 98–112)
CHOLEST SERPL-MCNC: 224 MG/DL (ref ?–200)
CO2 SERPL-SCNC: 30 MMOL/L (ref 21–32)
CREAT BLD-MCNC: 0.68 MG/DL
EGFRCR SERPLBLD CKD-EPI 2021: 95 ML/MIN/1.73M2 (ref 60–?)
EOSINOPHIL # BLD AUTO: 0.13 X10(3) UL (ref 0–0.7)
EOSINOPHIL NFR BLD AUTO: 3.9 %
ERYTHROCYTE [DISTWIDTH] IN BLOOD BY AUTOMATED COUNT: 12.5 %
EST. AVERAGE GLUCOSE BLD GHB EST-MCNC: 114 MG/DL (ref 68–126)
FASTING PATIENT LIPID ANSWER: YES
FASTING STATUS PATIENT QL REPORTED: YES
GLOBULIN PLAS-MCNC: 2.9 G/DL (ref 2–3.5)
GLUCOSE BLD-MCNC: 83 MG/DL (ref 70–99)
HBA1C MFR BLD: 5.6 % (ref ?–5.7)
HCT VFR BLD AUTO: 40.4 %
HDLC SERPL-MCNC: 91 MG/DL (ref 40–59)
HGB BLD-MCNC: 13.6 G/DL
IMM GRANULOCYTES # BLD AUTO: 0.01 X10(3) UL (ref 0–1)
IMM GRANULOCYTES NFR BLD: 0.3 %
LDLC SERPL CALC-MCNC: 120 MG/DL (ref ?–100)
LYMPHOCYTES # BLD AUTO: 1.39 X10(3) UL (ref 1–4)
LYMPHOCYTES NFR BLD AUTO: 41.6 %
MCH RBC QN AUTO: 30.6 PG (ref 26–34)
MCHC RBC AUTO-ENTMCNC: 33.7 G/DL (ref 31–37)
MCV RBC AUTO: 91 FL
MONOCYTES # BLD AUTO: 0.34 X10(3) UL (ref 0.1–1)
MONOCYTES NFR BLD AUTO: 10.2 %
NEUTROPHILS # BLD AUTO: 1.44 X10 (3) UL (ref 1.5–7.7)
NEUTROPHILS # BLD AUTO: 1.44 X10(3) UL (ref 1.5–7.7)
NEUTROPHILS NFR BLD AUTO: 43.1 %
NONHDLC SERPL-MCNC: 133 MG/DL (ref ?–130)
OSMOLALITY SERPL CALC.SUM OF ELEC: 293 MOSM/KG (ref 275–295)
PLATELET # BLD AUTO: 201 10(3)UL (ref 150–450)
POTASSIUM SERPL-SCNC: 4.5 MMOL/L (ref 3.5–5.1)
PROT SERPL-MCNC: 6.9 G/DL (ref 5.7–8.2)
PTH-INTACT SERPL-MCNC: 65.5 PG/ML (ref 18.5–88)
RBC # BLD AUTO: 4.44 X10(6)UL
SODIUM SERPL-SCNC: 141 MMOL/L (ref 136–145)
TRIGL SERPL-MCNC: 74 MG/DL (ref 30–149)
TSI SER-ACNC: 2.19 UIU/ML (ref 0.55–4.78)
VLDLC SERPL CALC-MCNC: 13 MG/DL (ref 0–30)
WBC # BLD AUTO: 3.3 X10(3) UL (ref 4–11)

## 2024-12-11 PROCEDURE — 83036 HEMOGLOBIN GLYCOSYLATED A1C: CPT

## 2024-12-11 PROCEDURE — 80053 COMPREHEN METABOLIC PANEL: CPT

## 2024-12-11 PROCEDURE — 85025 COMPLETE CBC W/AUTO DIFF WBC: CPT

## 2024-12-11 PROCEDURE — 83970 ASSAY OF PARATHORMONE: CPT

## 2024-12-11 PROCEDURE — 84443 ASSAY THYROID STIM HORMONE: CPT

## 2024-12-11 PROCEDURE — 80061 LIPID PANEL: CPT

## 2024-12-11 PROCEDURE — 36415 COLL VENOUS BLD VENIPUNCTURE: CPT

## 2024-12-13 ENCOUNTER — HOSPITAL ENCOUNTER (OUTPATIENT)
Dept: CV DIAGNOSTICS | Facility: HOSPITAL | Age: 69
Discharge: HOME | End: 2024-12-13
Attending: FAMILY MEDICINE
Payer: MEDICARE

## 2024-12-13 DIAGNOSIS — R07.9 EXERTIONAL CHEST PAIN: ICD-10-CM

## 2024-12-13 PROCEDURE — 93017 CV STRESS TEST TRACING ONLY: CPT | Performed by: FAMILY MEDICINE

## 2024-12-13 PROCEDURE — 93018 CV STRESS TEST I&R ONLY: CPT | Performed by: FAMILY MEDICINE

## 2024-12-24 ENCOUNTER — HOSPITAL ENCOUNTER (OUTPATIENT)
Dept: MAMMOGRAPHY | Age: 69
Discharge: HOME | End: 2024-12-24
Attending: FAMILY MEDICINE
Payer: MEDICARE

## 2024-12-24 DIAGNOSIS — Z12.31 VISIT FOR SCREENING MAMMOGRAM: ICD-10-CM

## 2024-12-24 PROCEDURE — 77063 BREAST TOMOSYNTHESIS BI: CPT | Performed by: FAMILY MEDICINE

## 2024-12-24 PROCEDURE — 77067 SCR MAMMO BI INCL CAD: CPT | Performed by: FAMILY MEDICINE

## 2025-06-30 ENCOUNTER — TELEPHONE (OUTPATIENT)
Dept: FAMILY MEDICINE CLINIC | Facility: CLINIC | Age: 70
End: 2025-06-30

## 2025-06-30 NOTE — TELEPHONE ENCOUNTER
Patient is doing a follow up on her Managed by Q message sent this morning.  Please advise.    Hello, I have been having stomach pain along with belching for several weeks now. I’ve had this in the past and, as advised by , took OTC Omeprazole for 14 days. I am also being careful with my diet. That seemed to help but the 1st day after I completed the Omeprozole the symptoms returned immediately. I’m back on Omeprozole and still have occasional dull stomach pain along with belching. Should I see a gastroenterologist?

## 2025-07-14 ENCOUNTER — PATIENT MESSAGE (OUTPATIENT)
Dept: FAMILY MEDICINE CLINIC | Facility: CLINIC | Age: 70
End: 2025-07-14

## 2025-07-14 RX ORDER — IPRATROPIUM BROMIDE 42 UG/1
1 SPRAY, METERED NASAL 2 TIMES DAILY
Qty: 1 EACH | Refills: 5 | Status: SHIPPED | OUTPATIENT
Start: 2025-07-14

## 2025-07-21 ENCOUNTER — HOSPITAL ENCOUNTER (OUTPATIENT)
Dept: BONE DENSITY | Age: 70
Discharge: HOME | End: 2025-07-21
Attending: FAMILY MEDICINE
Payer: MEDICARE

## 2025-07-21 DIAGNOSIS — Z78.0 POSTMENOPAUSAL: ICD-10-CM

## 2025-07-21 PROCEDURE — 77080 DXA BONE DENSITY AXIAL: CPT | Performed by: FAMILY MEDICINE

## 2025-07-25 ENCOUNTER — PATIENT MESSAGE (OUTPATIENT)
Dept: FAMILY MEDICINE CLINIC | Facility: CLINIC | Age: 70
End: 2025-07-25

## 2025-07-28 ENCOUNTER — RESULTS FOLLOW-UP (OUTPATIENT)
Dept: FAMILY MEDICINE CLINIC | Facility: CLINIC | Age: 70
End: 2025-07-28

## (undated) DIAGNOSIS — M77.31 CALCANEAL SPUR OF RIGHT FOOT: ICD-10-CM

## (undated) DIAGNOSIS — M79.671 INFLAMMATORY HEEL PAIN, RIGHT: ICD-10-CM

## (undated) DIAGNOSIS — M72.2 PLANTAR FASCIITIS OF RIGHT FOOT: Primary | ICD-10-CM

## (undated) NOTE — LETTER
GENERAL SURGERY    Jonah Lamb MD, FACS, [facility]. Jaswinder Levi MD, oMde Keane MD, FACS  Alexander Britton.  Ava Hwang MD, Brittney Barron MD, CATHY Barboza PA-C         Assessment    [facility] Derrick An

## (undated) NOTE — LETTER
03/01/18    Dear Khadijah Randhawa MD,        I am seeing  Liannelelo Gene in the office today for follow up. Jerel Piper is here today after follow-up of sclerotherapy. This is her second injection session.     Exam:   Residual varicosities in the bilateral

## (undated) NOTE — LETTER
17    Patient: Eli Wagoner  : 1955 Visit date: 2017    Dear  Dr. Boston Paula MD,    Thank you for referring Eli Wagoner to my practice. Please find my assessment and plan below.            Assessment   Umbilical hernia, incarce

## (undated) NOTE — LETTER
AUTHORIZATION FOR SURGICAL OPERATION OR OTHER PROCEDURE    1.  I hereby authorize Dr. Velma Tubbs, and [facility] staff assigned to my case to perform the following operation and/or procedure at the [facility]:    __________________________________ ________ A. M.  P.M.        Patient Name:  ______________________________________________________  (please print)      Patient signature:  ___________________________________________________             Relationship to Patient:           []  Parent    Respon

## (undated) NOTE — LETTER
GENERAL SURGERY    Abbey Peabody, MD, FACS, Cheryl Graves. Keily Carver MD, Makenna Bond MD, FACS  Fátima Davalos.  Cydney Mcguire MD, Russel Mandujano MD, FACS  Milind Hall, DO Dorys Denise MD  [facility] LAZ Zuniga PA-C

## (undated) NOTE — LETTER
GENERAL SURGERY    Vanessa Anton MD, FACS, Gretel Figueroa. Lianna Jenkins MD, Nima Lay MD, FACS  Mehreen Garcia.  Sunita Hammond MD, Aicha Valentino MD, FACS  DO Mitchell Smith MD  [facility] LAZ Alfonso PA-C

## (undated) NOTE — MR AVS SNAPSHOT
[address] [facility] [address]  [facility],  [address] [facility] [phone]               Thank you for choosing us for your health care visit with Marie King PA-C.   We are glad to serve you and happy to provide you with [facility] You can access your MyChart to more actively manage your health care and view more details from this visit by going to [email or web address].   If you've recently had a stay at the Hospital you can access your discharge instructions in [address] by bala